# Patient Record
Sex: FEMALE | Employment: UNEMPLOYED | URBAN - METROPOLITAN AREA
[De-identification: names, ages, dates, MRNs, and addresses within clinical notes are randomized per-mention and may not be internally consistent; named-entity substitution may affect disease eponyms.]

---

## 2018-04-10 NOTE — TELEPHONE ENCOUNTER
Patient is not a patient at the [facility]. Please send to Dr. Ponce's [facility]

## 2018-09-19 ENCOUNTER — HOSPITAL ENCOUNTER (EMERGENCY)
Facility: CLINIC | Age: 58
Discharge: HOME | End: 2018-09-19
Attending: EMERGENCY MEDICINE | Admitting: EMERGENCY MEDICINE
Payer: COMMERCIAL

## 2018-09-19 VITALS
RESPIRATION RATE: 16 BRPM | TEMPERATURE: 98.4 F | DIASTOLIC BLOOD PRESSURE: 99 MMHG | SYSTOLIC BLOOD PRESSURE: 144 MMHG | OXYGEN SATURATION: 98 %

## 2018-09-19 DIAGNOSIS — R10.84 ABDOMINAL PAIN, GENERALIZED: ICD-10-CM

## 2018-09-19 PROCEDURE — 99282 EMERGENCY DEPT VISIT SF MDM: CPT

## 2018-09-19 PROCEDURE — 99283 EMERGENCY DEPT VISIT LOW MDM: CPT | Mod: Z6 | Performed by: EMERGENCY MEDICINE

## 2018-09-19 RX ORDER — CETIRIZINE HYDROCHLORIDE 10 MG/1
10 TABLET ORAL DAILY
COMMUNITY

## 2018-09-19 ASSESSMENT — ENCOUNTER SYMPTOMS
VOMITING: 0
FLANK PAIN: 0
SHORTNESS OF BREATH: 0
NAUSEA: 1
CONSTIPATION: 0
CHILLS: 0
COUGH: 0
BLOOD IN STOOL: 0
ABDOMINAL PAIN: 1
FEVER: 0
DYSURIA: 0
APPETITE CHANGE: 1
FREQUENCY: 0
DIARRHEA: 1
ABDOMINAL DISTENTION: 1

## 2018-09-19 NOTE — ED AVS SNAPSHOT
[facility], [place], Emergency Department    [address] [place] [address]    Phone:  [phone]                                       Zahida Rangel   MRN: [id number]    Department:  [facility], Emergency Department   Date of Visit:  9/19/2018           After Visit Summary Signature Page     I have received my discharge instructions, and my questions have been answered. I have discussed any challenges I see with this plan with the nurse or doctor.    ..........................................................................................................................................  Patient/Patient Representative Signature      ..........................................................................................................................................  Patient Representative Print Name and Relationship to Patient    ..................................................               ................................................  Date                                   Time    ..........................................................................................................................................  Reviewed by Signature/Title    ...................................................              ..............................................  Date                                               Time          22EPIC Rev 08/18

## 2018-09-19 NOTE — ED NOTES
Pt refusing to allow registration to complete insurance information until after she has had the opportunity to read through her chart notes for today to ensure that her name was not slandered by the ED MD. Pt was educated on the process for accessing her medical records to include My Chart and the medical records contact information. Dr Thornton updated.

## 2018-09-19 NOTE — ED NOTES
"On discharge Zahida wanted to see her medical record to make sure it didn't have \"refused labs of CT scan\". I informed her that she would need to talk with medical records and that number was given to her. The following she had me write down and place in medical record. States she didn't refuse lab draw or CT and asked for 2nd md for clear explanation of risk/benifits of CT scan in language I understand (in words) on paper this was crossed out so I can make a clear and (conscience) on paper this was crossed out and informed medical decision given hx I have for benefit current & future health\".  "

## 2018-09-19 NOTE — ED PROVIDER NOTES
[facility]GENCY DEPARTMENT ([facility])  September 19, 2018    History     Chief Complaint   Patient presents with     Abdominal Pain     HPI  Zahida Rangel is a 57 year old female with history notable for peritoneal carcinoma (s/p VINCE and BSO 2016), BRCA2 positive s/p prophylactic bilateral mastectomy, eosinophilic esophagitis, anxiety, depression, and PTSD who presents to the ED with 13 days of diffuse abdominal pain.  Patient states that she has been seen by several facilities regarding her abdominal pain and just wants to know why she has it and why it is not getting any better.  She states she has never had similar abdominal pain before.  She states in the past 3 days, pain has gotten more severe and more frequent.  She describes the pain as mostly achy, waxing and waning, and sometimes cramping or stabbing.  She states that the pain is made worse with movement. She reports she has taken 4-5 days of ranitidine without improvement of her symptoms and also had no good relief from taking Tylenol.  She does state that she is passing gas from below and has had looser bowel movements today, secondary from taking a laxative last night.  She states that she started having a couple episodes of watery diarrhea after taking the laxative.  She also complains of some loss of appetite and nausea.  She otherwise currently denies any vomiting, vaginal discharge, vaginal bleeding, dysuria, fevers, previous history of SBO, previous history of reflux, chest pain, or shortness of breath.     Per review of Care Everywhere, patient has had several clinic and ED visits this past month regarding the same symptoms with extensive normal workup. She had a CT of the abdomen and pelvis on 8/24/18 that was negative for acute pathology.  She was seen in UC on 9/9 and was suspected to have diverticulitis, so they gave her a 7 day course of Cipro and Flagyl which did not improve symptoms. An abdominal and pelvic ultrasound  were performed on 9/14/18 and normal.     PAST MEDICAL HISTORY  History reviewed. No pertinent past medical history.     PAST SURGICAL HISTORY  History reviewed. No pertinent surgical history.     FAMILY HISTORY  No family history on file.     SOCIAL HISTORY  Social History   Substance Use Topics     Smoking status: Not on file     Smokeless tobacco: Not on file     Alcohol use Not on file     MEDICATIONS  No current facility-administered medications for this encounter.      Current Outpatient Prescriptions   Medication     cetirizine (ZYRTEC) 10 MG tablet     Eszopiclone (LUNESTA PO)     LORAZEPAM PO     ALLERGIES  Allergies   Allergen Reactions     Dilaudid [Hydromorphone]      Penicillins        I have reviewed the Medications, Allergies, Past Medical and Surgical History, and Social History in the Epic system.    Review of Systems   Constitutional: Positive for appetite change. Negative for chills, fever and unexpected weight change.   Respiratory: Negative for cough and shortness of breath.    Cardiovascular: Negative for chest pain.   Gastrointestinal: Positive for abdominal distention, abdominal pain, diarrhea and nausea. Negative for blood in stool, constipation and vomiting.   Genitourinary: Negative for dysuria, flank pain, frequency, pelvic pain, urgency, vaginal discharge and vaginal pain.   Musculoskeletal: Negative for back pain.   Allergic/Immunologic: Negative for immunocompromised state.   All other systems reviewed and are negative.      Physical Exam   BP: (!) 126/101  Heart Rate: 107  Temp: 98.4  F (36.9  C)  Resp: 16  SpO2: 99 %      Physical Exam   Constitutional: She is oriented to person, place, and time. She appears well-developed and well-nourished. No distress.   HENT:   Head: Normocephalic and atraumatic.   Eyes: Conjunctivae are normal. No scleral icterus.   Neck: Normal range of motion. Neck supple.   Cardiovascular: Normal rate, regular rhythm and normal heart sounds.     Pulmonary/Chest: Effort normal and breath sounds normal. No stridor. No respiratory distress. She has no wheezes. She has no rales.   Abdominal: Soft. She exhibits no distension and no mass. Bowel sounds are increased. There is tenderness in the periumbilical area and left upper quadrant. There is guarding. There is no rebound. No hernia.   Musculoskeletal: Normal range of motion.   Neurological: She is alert and oriented to person, place, and time.   Skin: Skin is warm and dry. No rash noted. She is not diaphoretic. No erythema.   Psychiatric: Her affect is angry and inappropriate. Her speech is not rapid and/or pressured and not slurred. She is agitated. She is not hyperactive, not slowed, not withdrawn and not actively hallucinating. Cognition and memory are normal. She is attentive.   Nursing note and vitals reviewed.      ED Course     ED Course     Procedures   2:37 PM  The patient was seen and examined by Dr. Thornton in Room 19.                Critical Care time:  none             Labs Ordered and Resulted from Time of ED Arrival Up to the Time of Departure from the ED - No data to display                            Assessments & Plan (with Medical Decision Making)   Zahida Rangel is a 57 year old female with history notable for peritoneal carcinoma (s/p VINCE and BSO 2016), BRCA2 positive s/p prophylactic bilateral mastectomy, eosinophilic esophagitis, anxiety, depression, and PTSD who presents to the ED with 13 days of diffuse abdominal pain.     Per review of chart, patient has been seen for abdominal pain 11 times by 9 different providers since August 15th. She was seen at [facility] yesterday with negative work up. I spoke with NP from GI clinic (by phone conversation) who saw patient in clinic this morning. She reports that she had reviewed patient's previous labs and imaging and added on stool studies. She discharged her with the recommendation to follow up with her primary. She did not feel that  "patient's symptoms were related to her previous diagnosis of eosinophilic esophagitis but had considered calling her back to have her obtain an abdominal xray to evaluate for obstruction and to discuss starting a trial PPI. I reviewed CareEverywhere documentation from [facility]. The patient was noted to be verbally abusive to staff at [facility] yesterday and was asked to leave the lobby of the cancer center earlier today when she began yelling in front of other patients. Documentation indicates she was offered multiple options for further care but refused.     Upon arrival to this ED, patient declined labs because she \"didn't see the point.\" When I began reviewing her previous work up the patient expressed disapproval of her previous providers and did not feel she had been cared for adequately. She also stated, \"I'm sorry to be rude, but why does it matter what anyone else did?\" I explained that I was trying to confirm the studies that had already been performed in order to determine if further work up would be of any utility and because most of her care had taken place in a different hospital system, our records may not be entirely up to date. She approved of this reasoning and cooperated with subsequent review of her course of symptoms and testing/treatments. She reported that she had been taking ranitidine prescribed for an allergic reaction but had not noticed an improvement in her abdominal pain while using this medication. She also denied heartburn or difficulty swallowing. She confirmed passage of liquid stool which she insisted was due to taking laxatives a few days ago. She also noted that her abdominal pain had gotten worse over the past 3 days. I suggested testing for C. Diff since her diarrhea started after taking prescribed abx (cipro/flagyl) and her pain had also gotten worse since then. She thought this was unnecessary as her diarrhea was from taking laxatives.     On exam, I noted significant " "tenderness with guarding in the left upper quadrant.     My recommendation was to either repeat a CT of her abdomen or to continue monitoring her symptoms and return if she got worse. She was very displeased with the idea of repeating a CT scan due to her fear of getting cancer again. I acknowledged that this was a reasonable concern and that, I too worried about exposing her to radiation for a study that may not provide any answers. However, because she had developed worsening pain since her last abdominal imaging and because she did have significant tenderness and guarding on exam it is possible that she may have developed surgical problem since her last CT scan. I explained that an Xray would not be able to identify a partial small bowel obstruction or ischemic changes of the bowel. I explained the meaning of \"guarding\" on exam as an indication of possible inflammation or infection within the abdomen that could indicate a more serious surgical issue. I also explained that her gaurding may be in part to anxiety and distress about her symptoms, which she agreed may be the case. At that point, I presented her with the option to obtain a repeat CT scan or follow up with her primary care doctor and continue to monitor her symptoms at home. I reassured her that either option would be reasonable but the decision about the risk versus benefit of exposure to radiation was largely dependent on her preferences and how concerned she was about the recent change in severity of her pain. I reminded her that her labs and vitals suggested she did not have a dangerous condition so I did not feel a CT was absolutely necessary. At this point the patient became frustrated and said, \"so you aren't going to do anything for me?!\" Again, I reminded the patient of her choices. I reviewed the risks and benefits of each option. She ultimately did not want to make a decision without a second opinion. I asked her if she wanted to be " discharged to get a second opinion from another doctor. She was offended that I would discharge her and said she wanted to talk to another doctor in the Emergency Department. I informed her that we do not do that and she demanded to speak with a charge nurse. Please see RN documentation. It was reiterated that patients are not assigned multiple emergency medicine doctors to give second opinions about management. The patient later stated that she did not understand her options. She was offered a repeat discussion with the presence of the charge RN to help explain anything she did not understand. She declined and became increasingly agitated deciding to leave the department. She also wished to dictate the documentation of her medical record and refused to give registration her information until she could review her chart and approve the content of the note from her encounter. She did not want the note to reflect that she had refused labs or CT. She dictated a statement which she demanded the charge RN write down verbatim in her chart. (Please see RN chart entry). She was offered information on self-pay so that she would not have to comply with registration. She was also provided with information on contacting the medical records department for subsequent review of her medical record.        I have reviewed the nursing notes.    I have reviewed the findings, diagnosis, plan and need for follow up with the patient.    Discharge Medication List as of 9/19/2018  3:27 PM          Final diagnoses:   Abdominal pain, generalized     IPerfecto, am serving as a trained medical scribe to document services personally performed by Kayli Thornton MD, based on the provider's statements to me.      Kayli CENTENO MD, was physically present and have reviewed and verified the accuracy of this note documented by Perfecto Torres.    9/19/2018   [facility], [place], EMERGENCY DEPARTMENT     Kayli Thornton MD  09/20/18 [id number]

## 2018-09-19 NOTE — ED TRIAGE NOTES
Pt presents to ED for a complaint of severe abdominal pain for the past 13 days. PT was seen at [facility] yesterday and told to follow up with GI outpatient but the pain was too unbearable.

## 2018-09-19 NOTE — ED AVS SNAPSHOT
[facility] [place], Emergency Department    [address] [address]    Phone:  [phone]                                       Zahida Rangel   MRN: [id number]    Department:  [facility], Emergency Department   Date of Visit:  9/19/2018           Patient Information     Date Of Birth          1960        Your diagnoses for this visit were:     Abdominal pain, generalized        You were seen by Kayli Thornton MD.        Discharge Instructions       Please make an appointment to follow up with Your Primary Care Provider in 1-2 days.      24 Hour Appointment Hotline       To make an appointment at any [facility] clinic, call [phone] ([phone]). If you don't have a family doctor or clinic, we will help you find one. [facility] clinics are conveniently located to serve the needs of you and your family.             Review of your medicines      Our records show that you are taking the medicines listed below. If these are incorrect, please call your family doctor or clinic.        Dose / Directions Last dose taken    cetirizine 10 MG tablet   Commonly known as:  zyrTEC   Dose:  10 mg        Take 10 mg by mouth daily   Refills:  0        LORAZEPAM PO        Take by mouth 2 times daily   Refills:  0        LUNESTA PO        Refills:  0                Orders Needing Specimen Collection     None      Pending Results     No orders found from 9/17/2018 to 9/20/2018.            Pending Culture Results     No orders found from 9/17/2018 to 9/20/2018.            Pending Results Instructions     If you had any lab results that were not finalized at the time of your Discharge, you can call the ED Lab Result RN at [phone]. You will be contacted by this team for any positive Lab results or changes in treatment. The nurses are available 7 days a week from 10A to 6:30P.  You can leave a message 24 hours per day and they will return your call.        Thank you for choosing [facility]       Thank you  "for choosing [facility] for your care. Our goal is always to provide you with excellent care. Hearing back from our patients is one way we can continue to improve our services. Please take a few minutes to complete the written survey that you may receive in the mail after you visit with us. Thank you!        [employer] Information     trivago lets you send messages to your doctor, view your test results, renew your prescriptions, schedule appointments and more. To sign up, go to [email or web address] . Click on \"Log in\" on the left side of the screen, which will take you to the Welcome page. Then click on \"Sign up Now\" on the right side of the page.     You will be asked to enter the access code listed below, as well as some personal information. Please follow the directions to create your username and password.     Your access code is: [email or web address]  Expires: 2018  3:18 PM     Your access code will  in 90 days. If you need help or a new code, please call your [facility] clinic or [phone].        Care EveryWhere ID     This is your Care EveryWhere ID. This could be used by other organizations to access your [facility] medical records  [id number]        Equal Access to Services     RONAL SCHNEIDER AH: Krunal Hitchcock, wajacksonda elizabeth, qaybta kaalmada adelionel, lizzy rodriguez. So [facility] [phone].    ATENCIÓN: Si habla español, tiene a mon disposición servicios gratuitos de asistencia lingüística. Llame al [phone].    We comply with applicable federal civil rights laws and [place] laws. We do not discriminate on the basis of race, color, national origin, age, disability, sex, sexual orientation, or gender identity.            After Visit Summary       This is your record. Keep this with you and show to your community pharmacist(s) and doctor(s) at your next visit.                  "

## 2018-09-20 ASSESSMENT — ENCOUNTER SYMPTOMS
BACK PAIN: 0
UNEXPECTED WEIGHT CHANGE: 0

## 2018-10-25 NOTE — ED NOTES
"Pt requested to see charge nurse. Zahida wanted to see another physician that \"I can trust and have a connection\". Stated she did not refused blood wok or CT. \"The doctor didn't explain it so I could understand. 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 Gave her the option to have md come and talk with her with myself in the room, but she declined. After discussion she said she wanted to leave and at that time got up and started to get dressed. I went to relay information to   " Anesthesia Type: 2% lidocaine with epinephrine

## 2018-11-28 ENCOUNTER — TRANSFERRED RECORDS (OUTPATIENT)
Dept: HEALTH INFORMATION MANAGEMENT | Facility: CLINIC | Age: 58
End: 2018-11-28

## 2019-01-11 ENCOUNTER — PREP FOR PROCEDURE (OUTPATIENT)
Dept: ONCOLOGY | Facility: CLINIC | Age: 59
End: 2019-01-11

## 2023-03-31 ENCOUNTER — PATIENT OUTREACH (OUTPATIENT)
Dept: ONCOLOGY | Facility: CLINIC | Age: 63
End: 2023-03-31
Payer: COMMERCIAL

## 2023-03-31 ENCOUNTER — TRANSCRIBE ORDERS (OUTPATIENT)
Dept: ONCOLOGY | Facility: CLINIC | Age: 63
End: 2023-03-31
Payer: COMMERCIAL

## 2023-03-31 DIAGNOSIS — C78.6 CARCINOMATOSIS PERITONEI (H): Primary | ICD-10-CM

## 2023-03-31 NOTE — PROGRESS NOTES
Referral received for transfer of care to remain with Dr Ponce at [facility]. Per referral instructions, will need follow up appointment in 3 months with labs prior, ok to get labs done in [place] and fax to [facility].

## 2023-04-03 DIAGNOSIS — C78.6 CARCINOMATOSIS PERITONEI (H): Primary | ICD-10-CM

## 2023-04-03 DIAGNOSIS — C56.3 MALIGNANT NEOPLASM OF BILATERAL OVARIES (H): ICD-10-CM

## 2023-04-03 NOTE — PROGRESS NOTES
Dr. Ponce entered orders, labs to be done in [place].     Left VM for pt requesting call back to discuss. Will need to figure out where to send lab orders and also arrange follow-up with Dr. Ambrosio here as requested. Will follow-up pending returned call from pt.     Candy Swain, BSN, RN, PHN, OCN  Hematology/Oncology Nurse Navigator  [facility]  [phone]

## 2023-05-02 ENCOUNTER — VIRTUAL VISIT (OUTPATIENT)
Dept: FAMILY MEDICINE | Facility: CLINIC | Age: 63
End: 2023-05-02
Payer: COMMERCIAL

## 2023-05-02 DIAGNOSIS — Z91.199 NO-SHOW FOR APPOINTMENT: Primary | ICD-10-CM

## 2023-05-02 NOTE — PROGRESS NOTES
This patient was a no show for this scheduled appointment.    Did not answer rooming phone calls and did not connect to video visit.      Kia West, CNP

## 2023-05-12 ENCOUNTER — PATIENT OUTREACH (OUTPATIENT)
Dept: ONCOLOGY | Facility: CLINIC | Age: 63
End: 2023-05-12
Payer: COMMERCIAL

## 2023-05-12 NOTE — TELEPHONE ENCOUNTER
Patient calling to schedule an appointment with Dr Ponce    Patient had questions about [facility] as patient is transferring care from [facility]     RN answered all of these to the best of her ability     Scheduling message sent     Patient appreciative of RN time     Mine Kelly RN

## 2023-05-15 NOTE — PROGRESS NOTES
Patient contacted DEIDRE MaiCC with Dr. Ponce directly regarding arranging follow-up with Dr. Ponce.     Writer/NPS have been trying to reach patient multiple times without success. Per Dr. Ponce, she needs  CBC with diff and CMP every month,  in three months which have all been ordered. Per Dr. Ponce, labs were to be drawn in [place] and faxed to her for review. However, have been unable to reach patient.     Attempted to reach patient again to discuss. Left VM requesting call back to confirm if she's been having labs done in [place] and also to help arrange labs/follow-up with Dr. Ponce ~end of June 2023. Provided direct call-back number and main number for NPS.

## 2023-05-25 NOTE — PROGRESS NOTES
Contacted patient again. Left another VM requesting call back to arrange labs/follow-up with Dr. Ponce. Provided contact info once again.     Hold Kris 6/16 needs labs prior. Will arrange appointment/labs pending call back from pt.

## 2023-06-12 NOTE — PROGRESS NOTES
"Patient called back and spoke with NPS who informed her they were unable to view scheduling instructions (scheduling instructions were in referral and sent to NPS via IB message). Pt then called and left  for writer venting frustrations with transfer of care process stating, \"This level of incompetence, if it weren't so irritating, would be comical.\"     Writer returned call and explained we have tried to reach pt multiple times with no call back to NPS. Explained she did not call back NPS/intake team as requested but instead called and spoke with Pau, RNCC with Dr. Ponce who is unable to schedule \"New\" pt appointments. Explained she and Pau discussed date for appt, but did not confirm time so there was no way to schedule pt without appointment time. Call transferred directly to NPS to confirm they are able to see scheduling instructions and schedule pt as requested.    Writer updated Annalee Lopez on pt frustrations, verbal abusive from pt and voicemail/situation. Will follow-up as needed  "

## 2023-06-12 NOTE — PROGRESS NOTES
Patient called back 6/9 regarding follow-up with Dr. Ponce and left VM stating she just recieved all my previous messages for some reason.     Attempted to reach patient again 6/12. Left another message re: scheduling follow-up 6/13 (spot re-held) or next opening per pt preference. Left call back number for NPS requesting call back to complete registration and schedule appointment.

## 2023-06-13 ENCOUNTER — LAB (OUTPATIENT)
Dept: LAB | Facility: CLINIC | Age: 63
End: 2023-06-13
Payer: COMMERCIAL

## 2023-06-13 DIAGNOSIS — C78.6 CARCINOMATOSIS PERITONEI (H): ICD-10-CM

## 2023-06-13 DIAGNOSIS — C56.3 MALIGNANT NEOPLASM OF BILATERAL OVARIES (H): ICD-10-CM

## 2023-06-13 LAB
ALBUMIN SERPL BCG-MCNC: 4.3 G/DL (ref 3.5–5.2)
ALP SERPL-CCNC: 85 U/L (ref 35–104)
ALT SERPL W P-5'-P-CCNC: 48 U/L (ref 0–50)
ANION GAP SERPL CALCULATED.3IONS-SCNC: 11 MMOL/L (ref 7–15)
AST SERPL W P-5'-P-CCNC: 33 U/L (ref 0–45)
BASOPHILS # BLD AUTO: 0 10E3/UL (ref 0–0.2)
BASOPHILS NFR BLD AUTO: 1 %
BILIRUB SERPL-MCNC: 0.4 MG/DL
BUN SERPL-MCNC: 14.5 MG/DL (ref 8–23)
CALCIUM SERPL-MCNC: 9.6 MG/DL (ref 8.8–10.2)
CANCER AG125 SERPL-ACNC: 8 U/ML
CHLORIDE SERPL-SCNC: 97 MMOL/L (ref 98–107)
CREAT SERPL-MCNC: 1.36 MG/DL (ref 0.51–0.95)
DEPRECATED HCO3 PLAS-SCNC: 26 MMOL/L (ref 22–29)
EOSINOPHIL # BLD AUTO: 0.3 10E3/UL (ref 0–0.7)
EOSINOPHIL NFR BLD AUTO: 3 %
ERYTHROCYTE [DISTWIDTH] IN BLOOD BY AUTOMATED COUNT: 13 % (ref 10–15)
GFR SERPL CREATININE-BSD FRML MDRD: 44 ML/MIN/1.73M2
GLUCOSE SERPL-MCNC: 96 MG/DL (ref 70–99)
HCT VFR BLD AUTO: 42.3 % (ref 35–47)
HGB BLD-MCNC: 13.8 G/DL (ref 11.7–15.7)
IMM GRANULOCYTES # BLD: 0 10E3/UL
IMM GRANULOCYTES NFR BLD: 0 %
LYMPHOCYTES # BLD AUTO: 3 10E3/UL (ref 0.8–5.3)
LYMPHOCYTES NFR BLD AUTO: 38 %
MCH RBC QN AUTO: 31.6 PG (ref 26.5–33)
MCHC RBC AUTO-ENTMCNC: 32.6 G/DL (ref 31.5–36.5)
MCV RBC AUTO: 97 FL (ref 78–100)
MONOCYTES # BLD AUTO: 0.6 10E3/UL (ref 0–1.3)
MONOCYTES NFR BLD AUTO: 8 %
NEUTROPHILS # BLD AUTO: 4 10E3/UL (ref 1.6–8.3)
NEUTROPHILS NFR BLD AUTO: 51 %
PLATELET # BLD AUTO: 205 10E3/UL (ref 150–450)
POTASSIUM SERPL-SCNC: 3.9 MMOL/L (ref 3.4–5.3)
PROT SERPL-MCNC: 6.8 G/DL (ref 6.4–8.3)
RBC # BLD AUTO: 4.37 10E6/UL (ref 3.8–5.2)
SODIUM SERPL-SCNC: 134 MMOL/L (ref 136–145)
WBC # BLD AUTO: 7.8 10E3/UL (ref 4–11)

## 2023-06-13 PROCEDURE — 36415 COLL VENOUS BLD VENIPUNCTURE: CPT

## 2023-06-13 PROCEDURE — 80053 COMPREHEN METABOLIC PANEL: CPT

## 2023-06-13 PROCEDURE — 86304 IMMUNOASSAY TUMOR CA 125: CPT

## 2023-06-13 PROCEDURE — 85025 COMPLETE CBC W/AUTO DIFF WBC: CPT

## 2023-06-15 NOTE — TELEPHONE ENCOUNTER
RECORDS STATUS - ALL OTHER DIAGNOSIS      RECORDS RECEIVED FROM: [facility], [facility], [facility], [facility]   DATE RECEIVED:    NOTES STATUS DETAILS   OFFICE NOTE from medical oncologist ALEXA -  Dr. Ponce: 3/17/23   DISCHARGE SUMMARY from hospital CE -  10/2/18   DISCHARGE REPORT from the ER [facility]/CE -  Many   OPERATIVE REPORT CE - , [facility]  9/25/20, 1/30/20: Breast Implant Removal  7/20/17: IP Port Removal  12/12/16: Exploratory Laparotomy    [facility]  5/30/18: Mastectomy   MEDICATION LIST [facility]   LABS     PATHOLOGY REPORTS [facility], Reports in CE 11/30/16: [id number]  11/28/16: [id number]  7/20/17: [id number]     ANYTHING RELATED TO DIAGNOSIS Epic 6/13/23   GENONOMIC TESTING     TYPE: HP - Received 6/15 11/2018   IMAGING (NEED IMAGES & REPORT)     CT SCANS PACS   11/28/18, 10/2/18, 6/26/17, 6/15/17, 12/2/16, 11/30/16, 11/29/16, 8/14/14, 8/12/14, 5/13/14    [facility]  8/24/18, 4/12/18    [facility]  4/10/18   MRI PACS   6/15/16, 3/1/16   ULTRASOUND PACS   5/9/19, 9/14/18, 4/11/18, 1/2/18, 9/28/17, 11/30/16 2/15/16, 2/12/15, 3/28/13   PET PACS   1/28/19, 11/21/18, 3/29/18, 5/30/17

## 2023-06-20 ENCOUNTER — PRE VISIT (OUTPATIENT)
Dept: ONCOLOGY | Facility: CLINIC | Age: 63
End: 2023-06-20
Payer: COMMERCIAL

## 2023-06-20 ENCOUNTER — ONCOLOGY VISIT (OUTPATIENT)
Dept: ONCOLOGY | Facility: CLINIC | Age: 63
End: 2023-06-20
Attending: OBSTETRICS & GYNECOLOGY
Payer: COMMERCIAL

## 2023-06-20 DIAGNOSIS — C78.6 CARCINOMATOSIS PERITONEI (H): Primary | ICD-10-CM

## 2023-06-20 PROCEDURE — G0463 HOSPITAL OUTPT CLINIC VISIT: HCPCS | Performed by: OBSTETRICS & GYNECOLOGY

## 2023-06-20 PROCEDURE — 99214 OFFICE O/P EST MOD 30 MIN: CPT | Performed by: OBSTETRICS & GYNECOLOGY

## 2023-06-20 RX ORDER — CLONAZEPAM 0.5 MG/1
0.25 TABLET ORAL
COMMUNITY
Start: 2023-04-28

## 2023-06-20 RX ORDER — TRIAMCINOLONE ACETONIDE 5 MG/G
CREAM TOPICAL 2 TIMES DAILY
COMMUNITY
Start: 2022-07-21

## 2023-06-20 RX ORDER — LANOLIN ALCOHOL/MO/W.PET/CERES
CREAM (GRAM) TOPICAL
COMMUNITY

## 2023-06-20 RX ORDER — MULTIPLE VITAMINS W/ MINERALS TAB 9MG-400MCG
TAB ORAL
COMMUNITY

## 2023-06-20 RX ORDER — ALBUTEROL SULFATE 90 UG/1
2 AEROSOL, METERED RESPIRATORY (INHALATION) EVERY 4 HOURS PRN
COMMUNITY
Start: 2022-07-20

## 2023-06-20 RX ORDER — PREDNISONE 20 MG/1
2 TABLET ORAL
COMMUNITY
Start: 2022-07-05

## 2023-06-20 RX ORDER — CALCIUM CARB/VITAMIN D3/VIT K1 500-500-40
2 TABLET,CHEWABLE ORAL DAILY
COMMUNITY

## 2023-06-20 RX ORDER — VIT C/B6/B5/MAGNESIUM/HERB 173 50-5-6-5MG
1 CAPSULE ORAL DAILY
COMMUNITY

## 2023-06-20 RX ORDER — ZOLPIDEM TARTRATE 5 MG/1
5 TABLET ORAL
COMMUNITY
Start: 2022-12-27

## 2023-06-20 RX ORDER — COLLAGEN, HYDROLYSATE (BOVINE) 100 %
POWDER (GRAM) MISCELLANEOUS
COMMUNITY

## 2023-06-20 NOTE — LETTER
6/20/2023         RE: Zahida Rangel  [address]  [address]  [place] [address]        Dear Colleague,    Thank you for referring your patient, Zahida Rangel, to the [facility]. Please see a copy of my visit note below.                            Consult Notes on Referred Patient    Date: 6/20/2023     Patient is seen today for follow-up, transfer of care from [facility].       Her history is as follows:    2-26-16: Referred to Gyn Onc for evaluation of cervical fibroid with prominent vascularity. CA-125 39.     3-11-16: Pap smear normal. Repeat  33. MRI pelvis showed 3.6 x 3.2 x 3cm mass right side of cervix, most likley a leiomyoma. EMS normal thickness. Small cystic area in cul de sac measuring 1.5cm, possible peritoneal inclusion cyst. Recommended repeat imaging in three months    6-15-16:  22. MRI pelvis with 2.7 x 3.0 x 3.3cm mass right posterolateral aspect of cervix, consistent with fibroid and similar to prior MRI. EMS 4mm. No further Gyn Onc follow-up recommended.    11/29 to 11/30/16: Admitted to [facility] with abdominal pain. CT abd/pelvis with peritoneal carcinomatosis. Fullness left ovary. Pelvic US with normal uterus, ES 7.4mm. Hypoechoic mass cervix measuring 3.2 x 2.8cm, ovaries normal, no free fluid.  53. Underwent CT guided biopsy of omental area showing neoplasm of Muellerian origin, difficult to characterize due to small amount of tissue.    12/12/16: Exploratory laparotomy, modified radical hysterectomy, bilateral salpingo-oophorectomy, bilateral pelvic and para-aortic lymphadenectomy, omentectomy, pelvic washings, intraperitoneal port placement, proctoscopy. Operative findings were notable for very small volume disease limited to pelvis, no obvious lesions in omentum or upper abdomen. R0 resected to no visible disease. 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 +Cytology. 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    The patient returns for follow-up. She was seen by [place] by Dr. Bella for second opinion regarding chemotherapy. Dose dense as well as IV/IP were discussed.     1/12/17: Cycle #1 IV/IP Cisplatin and Taxol. Reaction to IV taxol, able to tolerate after slow infusion. Steroids premed for IP taxol, tolerated.  40    2/2/17: Cycle #2 IV/IP Cisplatin and Taxol.  30    2/23/17: Cycle #3 IV/IP Cisplatin and Taxol.  20    3/16/17: Cycle #4 IV/IP Cisplatin and Taxol.  18    4/6/17: Cycle #5 IV/IP Cisplatin and Taxol.  17    4/28/17: Cycle #6 IV/IP Cisplatin and Taxol.  17. Day 8 held due to anemia. Transfused 1 unit. Day 8 given on 5/12/17.    5/30/17: PET scan with no evidence of disease. 15 x 7 cm lymphocele on left, 8 x 4cm on right.    Underwent bilateral drain placements to treat lymphoceles. Complicated by infection with abscess development. Sclerotherapy performed and drains removed on 7/10/17. Had IP port removed on 7/20/17.    9/15/17: Complained of RLQ pain. Pelvic US negative.  9    11/27/17:  10    12/21/17:  9    3/28/18:  8 PET/CT on 3/29/18 BRIGIDO    4/10/18: CT A/P for RLQ pain BRIGIDO. US Appendix with no free fluid    4/12/18: CT A/P soft tissue stranding mesenteric fat right anterior abdominal fat    8/24/18: CT A/P for abdominal pain negative.  16    9/14/18: US abd for pain, negative for hernia. Seen in clinic for LLQ pain with associated pain with bladder emptying, feels that something is tethering her bladder to the abdominal wall and holding it there. Has seen Urology with negative workup. Was lying flat and noted a bulge in lower abdomen with associated tenderness. Had US today that was negative for hernia. Declined CT scan and cannot have MRI due to implants.     9/28/18:  82    10/2/18: CT A/P for ongoing  pain. Multipled dilated loops for bowel with possible ileitis.    10/18/18:  62    11/19/18:  104    11/21/18: PET with recurrent peritoneal carcinomatosis. 1.2cm nodule mid abdomen, 1.2cm nodule sigmoid colon    11/28/18: CT biopsy Path showed recurrent high grade carcinoma. Discussed salvage chemotherapy +/- PARPi with plans for possible surgery    12/5/18: C1D1 Carbo/Doxil. First cycle difficult, appeal ongoing for PARPi.  168    12/14/18: Seen in clinic. Plan for surgery after second rather than first cycle, coordinate with breast surgery.    1/4/19: C2D1 Carbo/Doxil. Hold on PARP due to counts, not wanting to potentially delay surgery.  90    1/28/19: PET CT with complete response, no signs of disease. Decision not to proceed with surgery due to CR.    2/1/19: C3D1 Carbo/Doxil. Continue to hold on PARP.  30     3/1/19: C4D1 Carbo/Doxil. Neulasta.  13    3/29/19: C5D1 Carbo/Doxil. Neulasta  10    4/23/19:  10    4/26/19: C6D1 Carbo/Doxil. Neulasta. Patient wanted to proceed with breast surgery ASAP.    6/10/19: Breast reconstruction done on 6/10/19.     6/25/19: PARPi Olaparib started 300mg BID    7/11/19: Dose reduced after two weeks due to significant fatigue. 200mg BID.     7/19/19: Some frustration with how she is feeling on the PARPi. Perhaps slightly better at lower dose but still dosen't like it. Continued at 200mg BID.    8/30/19: BRIGIDO, remains on 200mg BID of PARPi    10/11/19: Stable, remains on 200mg BID of PARPi,  7    12/20/19: Routine visit. Significant fatigue. PARP increased to 150mg BID at patient request for one month.    1/24/20: Developed breast cellulitis and implant infection while in [place]     1/30/20: Infected implant removed.  7    Increased PARPi to 200mg BID at end of March.    3/26/20: Labs in [place].  7    4/17/20: Phone visit. Back from [place]. Some occassional pelvic/pubic bone cramping. Due to ongoing fatigue, decreased PARPi  to 150mg AM and 200mg in PM.  9    7/24/20: Due to ongoing fatigue, has dropped to 150mg BID, remains compliant    8/6/20:  8, Labs WNL. Crt 1.36    8/21/20: Seen in clinic. BRIGIDO on exam. Continue PARPi at 150mg BID    9/11/20: Patient seen today for unscheduled visit. Had episode of bleeding x 1 on 9/8/20. Exam showed periurethral laceration. Continue PARP. Labs in [place].  9    3/9/21 -  7 Remains on PARP 150mg BID    7/7/21:  6    9/17/21:  9    12/13/21:  12 Remains on PARP 150mg BID    3/7/22:  8    5/11/22:  10 Remains on PARP 150mg BID    6/23/22:  11 Remains on PARP 150mg BID    Stopped PARP August 25 due to some issues with fluid retention.    Was off PARP until October 2022.    10/25/22:   11.  PARP resumed at 100mg BID    12/22:  Stable.  PARP 100mg BID    3/17/23:  Stable.  PARP 100mg BID    6/13/23:  Labs reviewed.   8, Cr 1.36    Patient seen today for evaluation.  Doing well, remains on PARP.  Visiting [place] for a period of time.  Otherwise, continues to live in [place].  No new symptoms.           Past Medical History:    No past medical history on file.      Past Surgical History:    No past surgical history on file.      Health Maintenance:  Health Maintenance Due   Topic Date Due    ADVANCE CARE PLANNING  Never done    COVID-19 Vaccine (1) Never done    COLORECTAL CANCER SCREENING  Never done    HIV SCREENING  Never done    HEPATITIS C SCREENING  Never done    ZOSTER IMMUNIZATION (1 of 2) Never done    PAP  Never done    LIPID  Never done    MAMMO SCREENING  06/24/2018    PHQ-2 (once per calendar year)  Never done         Current Medications:     has a current medication list which includes the following prescription(s): albuterol, clonazepam, olaparib, triamcinolone, zolpidem, alpha-lipoic acid, calcium carbonate, cetirizine, cholecalciferol, collagen hydrolysate, eszopiclone, george (zingiber officinalis), lorazepam, magnesium  bisglycinate, melatonin, multivitamin w/minerals, prednisone, and turmeric.       Allergies:     Dilaudid [hydromorphone] and Penicillins        Social History:     Social History     Tobacco Use    Smoking status: Never    Smokeless tobacco: Never   Substance Use Topics    Alcohol use: Not on file       History   Drug Use Not on file           Family History:     The patient's family history is notable for:    No family history on file.      Physical Exam:     LMP  (LMP Unknown)   There is no height or weight on file to calculate BMI.    General Appearance: healthy and alert, no distress     Assessment:    63yo with history of recurrent primary peritoneal cancer, now on PARP.    Total time for the day of visit was 30 minutes including, but not limited to, non-face-to-face time spent reviewing records, counseling, and coordination of care.    Plan:       1.) Recurrent platinum sensitive primary peritoneal cancer: Completed two cycles with PET showing complete response. Had previously discussed possible secondary surgery given small volume disease with tentative plan had been for surgery in early January 2019, after first cycle, and then changed to surgery after her second cycle to allow for better planning with her breast surgeon to get expanders removed and implants placed.     Given complete response based on PET, decision was made not to proceed with surgery as I felt there was minimal to no benefit in undergoing a potentially complex surgical exploration with no obvious signs of disease on imaging but with potential significant delays in chemo if she has a complication.    Therefore, recommended completing six cycles of chemo and then PARPi maintenance. She completed six cycles. She then completed her breast surgery and started PARPi, olaparib, in June 2019. Dose reduction (300BID to 200BID) after two weeks due to fatigue, very small decrease in Hgb and Cr slightly elevated. Dose decreased to 150mg BID in  12/2019 due to fatigue and negative impact on QOL. Stayed at 150mg BID for about three months and then back at 200mg BID and tolerating well. In April 2020, struggling again with fatigue so dropped to PARPi to 150mg in AM, 200mg in PM to ensure continuation and then to 150mg BID which she maintained from July 2020 to August 2022.     Was on a short break from PARP from August 2022 to October 2022 at which time she resumed at 100mg BID.  Discussed risks of long term PARP, SOLO study with 22% of patients on PARP for >5 years. Up to 8% risk of AML. Patient has benefited from PARP. Risks and benefits of continued use discussed.     Informed decision made to continue PARP.  Zahida remains stable, no new symptoms.  stable.  Tolerating PARP at 100mg BID.  Her Cr is slightly elevated over her baseline on recent labs and I would like her to get this rechecked in a couple of weeks.  Otherwise, continue monthly labs.  Repeat  in three months.     Return in three months with labs and .     Chata Ponce MD  Gynecologic Oncology  [facility]    CC  Patient Care Team:  Homa Cruz as PCP - General (Internal Medicine)  Chata Ponce MD as MD (Gynecologic Oncology)

## 2023-07-05 ENCOUNTER — PATIENT OUTREACH (OUTPATIENT)
Dept: ONCOLOGY | Facility: CLINIC | Age: 63
End: 2023-07-05
Payer: COMMERCIAL

## 2023-07-05 ENCOUNTER — LAB (OUTPATIENT)
Dept: LAB | Facility: CLINIC | Age: 63
End: 2023-07-05
Payer: COMMERCIAL

## 2023-07-05 DIAGNOSIS — C56.3 MALIGNANT NEOPLASM OF BILATERAL OVARIES (H): Primary | ICD-10-CM

## 2023-07-05 DIAGNOSIS — C78.6 CARCINOMATOSIS PERITONEI (H): ICD-10-CM

## 2023-07-05 DIAGNOSIS — C56.3 MALIGNANT NEOPLASM OF BILATERAL OVARIES (H): ICD-10-CM

## 2023-07-05 LAB
BASOPHILS # BLD AUTO: 0 10E3/UL (ref 0–0.2)
BASOPHILS NFR BLD AUTO: 0 %
EOSINOPHIL # BLD AUTO: 0.2 10E3/UL (ref 0–0.7)
EOSINOPHIL NFR BLD AUTO: 3 %
ERYTHROCYTE [DISTWIDTH] IN BLOOD BY AUTOMATED COUNT: 13.3 % (ref 10–15)
HCT VFR BLD AUTO: 41.6 % (ref 35–47)
HGB BLD-MCNC: 13.8 G/DL (ref 11.7–15.7)
IMM GRANULOCYTES # BLD: 0 10E3/UL
IMM GRANULOCYTES NFR BLD: 0 %
LYMPHOCYTES # BLD AUTO: 2.8 10E3/UL (ref 0.8–5.3)
LYMPHOCYTES NFR BLD AUTO: 32 %
MCH RBC QN AUTO: 31.7 PG (ref 26.5–33)
MCHC RBC AUTO-ENTMCNC: 33.2 G/DL (ref 31.5–36.5)
MCV RBC AUTO: 95 FL (ref 78–100)
MONOCYTES # BLD AUTO: 0.8 10E3/UL (ref 0–1.3)
MONOCYTES NFR BLD AUTO: 9 %
NEUTROPHILS # BLD AUTO: 4.9 10E3/UL (ref 1.6–8.3)
NEUTROPHILS NFR BLD AUTO: 56 %
PLATELET # BLD AUTO: 204 10E3/UL (ref 150–450)
RBC # BLD AUTO: 4.36 10E6/UL (ref 3.8–5.2)
WBC # BLD AUTO: 8.8 10E3/UL (ref 4–11)

## 2023-07-05 PROCEDURE — 85025 COMPLETE CBC W/AUTO DIFF WBC: CPT

## 2023-07-05 PROCEDURE — 80053 COMPREHEN METABOLIC PANEL: CPT

## 2023-07-05 PROCEDURE — 36415 COLL VENOUS BLD VENIPUNCTURE: CPT

## 2023-07-05 NOTE — PROGRESS NOTES
Scheduling checked in with patient in regards to need for lab appt this week    Per Scheduling: I spoke with pt and she is going to do walk in lab at her convenience this week and I confirmed with her that orders are in      Mine Kelly RN     4

## 2023-07-06 LAB
ALBUMIN SERPL BCG-MCNC: 4.4 G/DL (ref 3.5–5.2)
ALP SERPL-CCNC: 76 U/L (ref 35–104)
ALT SERPL W P-5'-P-CCNC: 41 U/L (ref 0–50)
ANION GAP SERPL CALCULATED.3IONS-SCNC: 11 MMOL/L (ref 7–15)
AST SERPL W P-5'-P-CCNC: 30 U/L (ref 0–45)
BILIRUB SERPL-MCNC: 0.5 MG/DL
BUN SERPL-MCNC: 18.5 MG/DL (ref 8–23)
CALCIUM SERPL-MCNC: 10.1 MG/DL (ref 8.8–10.2)
CHLORIDE SERPL-SCNC: 98 MMOL/L (ref 98–107)
CREAT SERPL-MCNC: 1.2 MG/DL (ref 0.51–0.95)
DEPRECATED HCO3 PLAS-SCNC: 28 MMOL/L (ref 22–29)
GFR SERPL CREATININE-BSD FRML MDRD: 51 ML/MIN/1.73M2
GLUCOSE SERPL-MCNC: 97 MG/DL (ref 70–99)
POTASSIUM SERPL-SCNC: 4 MMOL/L (ref 3.4–5.3)
PROT SERPL-MCNC: 6.8 G/DL (ref 6.4–8.3)
SODIUM SERPL-SCNC: 137 MMOL/L (ref 136–145)

## 2023-07-07 ENCOUNTER — PATIENT OUTREACH (OUTPATIENT)
Dept: ONCOLOGY | Facility: CLINIC | Age: 63
End: 2023-07-07
Payer: COMMERCIAL

## 2023-07-07 DIAGNOSIS — C78.6 CARCINOMATOSIS PERITONEI (H): Primary | ICD-10-CM

## 2023-07-07 NOTE — PROGRESS NOTES
Patient updated on lab results and follow up plan per MD request     RN answered all questions to the best of her ability     Patient appreciative of the call     Mine Kelly RN

## 2023-07-17 NOTE — PROGRESS NOTES
Consult Notes on Referred Patient    Date: 6/20/2023     Patient is seen today for follow-up, transfer of care from [facility].       Her history is as follows:    2-26-16: Referred to Gyn Onc for evaluation of cervical fibroid with prominent vascularity. CA-125 39.     3-11-16: Pap smear normal. Repeat  33. MRI pelvis showed 3.6 x 3.2 x 3cm mass right side of cervix, most likley a leiomyoma. EMS normal thickness. Small cystic area in cul de sac measuring 1.5cm, possible peritoneal inclusion cyst. Recommended repeat imaging in three months    6-15-16:  22. MRI pelvis with 2.7 x 3.0 x 3.3cm mass right posterolateral aspect of cervix, consistent with fibroid and similar to prior MRI. EMS 4mm. No further Gyn Onc follow-up recommended.    11/29 to 11/30/16: Admitted to [facility] with abdominal pain. CT abd/pelvis with peritoneal carcinomatosis. Fullness left ovary. Pelvic US with normal uterus, ES 7.4mm. Hypoechoic mass cervix measuring 3.2 x 2.8cm, ovaries normal, no free fluid.  53. Underwent CT guided biopsy of omental area showing neoplasm of Muellerian origin, difficult to characterize due to small amount of tissue.    12/12/16: Exploratory laparotomy, modified radical hysterectomy, bilateral salpingo-oophorectomy, bilateral pelvic and para-aortic lymphadenectomy, omentectomy, pelvic washings, intraperitoneal port placement, proctoscopy. Operative findings were notable for very small volume disease limited to pelvis, no obvious lesions in omentum or upper abdomen. R0 resected to no visible disease. 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 +Cytology. 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    The patient returns for follow-up. She was seen by [place] by Dr. Bella for second opinion regarding chemotherapy. Dose dense as  well as IV/IP were discussed.     1/12/17: Cycle #1 IV/IP Cisplatin and Taxol. Reaction to IV taxol, able to tolerate after slow infusion. Steroids premed for IP taxol, tolerated.  40    2/2/17: Cycle #2 IV/IP Cisplatin and Taxol.  30    2/23/17: Cycle #3 IV/IP Cisplatin and Taxol.  20    3/16/17: Cycle #4 IV/IP Cisplatin and Taxol.  18    4/6/17: Cycle #5 IV/IP Cisplatin and Taxol.  17    4/28/17: Cycle #6 IV/IP Cisplatin and Taxol.  17. Day 8 held due to anemia. Transfused 1 unit. Day 8 given on 5/12/17.    5/30/17: PET scan with no evidence of disease. 15 x 7 cm lymphocele on left, 8 x 4cm on right.    Underwent bilateral drain placements to treat lymphoceles. Complicated by infection with abscess development. Sclerotherapy performed and drains removed on 7/10/17. Had IP port removed on 7/20/17.    9/15/17: Complained of RLQ pain. Pelvic US negative.  9    11/27/17:  10    12/21/17:  9    3/28/18:  8 PET/CT on 3/29/18 BRIGIDO    4/10/18: CT A/P for RLQ pain BRIGIDO. US Appendix with no free fluid    4/12/18: CT A/P soft tissue stranding mesenteric fat right anterior abdominal fat    8/24/18: CT A/P for abdominal pain negative.  16    9/14/18: US abd for pain, negative for hernia. Seen in clinic for LLQ pain with associated pain with bladder emptying, feels that something is tethering her bladder to the abdominal wall and holding it there. Has seen Urology with negative workup. Was lying flat and noted a bulge in lower abdomen with associated tenderness. Had US today that was negative for hernia. Declined CT scan and cannot have MRI due to implants.     9/28/18:  82    10/2/18: CT A/P for ongoing pain. Multipled dilated loops for bowel with possible ileitis.    10/18/18:  62    11/19/18:  104    11/21/18: PET with recurrent peritoneal carcinomatosis. 1.2cm nodule mid abdomen, 1.2cm nodule sigmoid colon    11/28/18: CT biopsy Path showed  recurrent high grade carcinoma. Discussed salvage chemotherapy +/- PARPi with plans for possible surgery    12/5/18: C1D1 Carbo/Doxil. First cycle difficult, appeal ongoing for PARPi.  168    12/14/18: Seen in clinic. Plan for surgery after second rather than first cycle, coordinate with breast surgery.    1/4/19: C2D1 Carbo/Doxil. Hold on PARP due to counts, not wanting to potentially delay surgery.  90    1/28/19: PET CT with complete response, no signs of disease. Decision not to proceed with surgery due to CR.    2/1/19: C3D1 Carbo/Doxil. Continue to hold on PARP.  30     3/1/19: C4D1 Carbo/Doxil. Neulasta.  13    3/29/19: C5D1 Carbo/Doxil. Neulasta  10    4/23/19:  10    4/26/19: C6D1 Carbo/Doxil. Neulasta. Patient wanted to proceed with breast surgery ASAP.    6/10/19: Breast reconstruction done on 6/10/19.     6/25/19: PARPi Olaparib started 300mg BID    7/11/19: Dose reduced after two weeks due to significant fatigue. 200mg BID.     7/19/19: Some frustration with how she is feeling on the PARPi. Perhaps slightly better at lower dose but still dosen't like it. Continued at 200mg BID.    8/30/19: BRIGIDO, remains on 200mg BID of PARPi    10/11/19: Stable, remains on 200mg BID of PARPi,  7    12/20/19: Routine visit. Significant fatigue. PARP increased to 150mg BID at patient request for one month.    1/24/20: Developed breast cellulitis and implant infection while in [place]     1/30/20: Infected implant removed.  7    Increased PARPi to 200mg BID at end of March.    3/26/20: Labs in [place].  7    4/17/20: Phone visit. Back from [place]. Some occassional pelvic/pubic bone cramping. Due to ongoing fatigue, decreased PARPi to 150mg AM and 200mg in PM.  9    7/24/20: Due to ongoing fatigue, has dropped to 150mg BID, remains compliant    8/6/20:  8, Labs WNL. Crt 1.36    8/21/20: Seen in clinic. BRIGIDO on exam. Continue PARPi at 150mg BID    9/11/20: Patient seen today  for unscheduled visit. Had episode of bleeding x 1 on 9/8/20. Exam showed periurethral laceration. Continue PARP. Labs in [place].  9    3/9/21 -  7 Remains on PARP 150mg BID    7/7/21:  6    9/17/21:  9    12/13/21:  12 Remains on PARP 150mg BID    3/7/22:  8    5/11/22:  10 Remains on PARP 150mg BID    6/23/22:  11 Remains on PARP 150mg BID    Stopped PARP August 25 due to some issues with fluid retention.    Was off PARP until October 2022.    10/25/22:   11.  PARP resumed at 100mg BID    12/22:  Stable.  PARP 100mg BID    3/17/23:  Stable.  PARP 100mg BID    6/13/23:  Labs reviewed.   8, Cr 1.36    Patient seen today for evaluation.  Doing well, remains on PARP.  Visiting [place] for a period of time.  Otherwise, continues to live in [place].  No new symptoms.           Past Medical History:    No past medical history on file.      Past Surgical History:    No past surgical history on file.      Health Maintenance:  Health Maintenance Due   Topic Date Due     ADVANCE CARE PLANNING  Never done     COVID-19 Vaccine (1) Never done     COLORECTAL CANCER SCREENING  Never done     HIV SCREENING  Never done     HEPATITIS C SCREENING  Never done     ZOSTER IMMUNIZATION (1 of 2) Never done     PAP  Never done     LIPID  Never done     MAMMO SCREENING  06/24/2018     PHQ-2 (once per calendar year)  Never done         Current Medications:     has a current medication list which includes the following prescription(s): albuterol, clonazepam, olaparib, triamcinolone, zolpidem, alpha-lipoic acid, calcium carbonate, cetirizine, cholecalciferol, collagen hydrolysate, eszopiclone, george (zingiber officinalis), lorazepam, magnesium bisglycinate, melatonin, multivitamin w/minerals, prednisone, and turmeric.       Allergies:     Dilaudid [hydromorphone] and Penicillins        Social History:     Social History     Tobacco Use     Smoking status: Never     Smokeless tobacco: Never    Substance Use Topics     Alcohol use: Not on file       History   Drug Use Not on file           Family History:     The patient's family history is notable for:    No family history on file.      Physical Exam:     LMP  (LMP Unknown)   There is no height or weight on file to calculate BMI.    General Appearance: healthy and alert, no distress     Assessment:    63yo with history of recurrent primary peritoneal cancer, now on PARP.    Total time for the day of visit was 30 minutes including, but not limited to, non-face-to-face time spent reviewing records, counseling, and coordination of care.    Plan:       1.) Recurrent platinum sensitive primary peritoneal cancer: Completed two cycles with PET showing complete response. Had previously discussed possible secondary surgery given small volume disease with tentative plan had been for surgery in early January 2019, after first cycle, and then changed to surgery after her second cycle to allow for better planning with her breast surgeon to get expanders removed and implants placed.     Given complete response based on PET, decision was made not to proceed with surgery as I felt there was minimal to no benefit in undergoing a potentially complex surgical exploration with no obvious signs of disease on imaging but with potential significant delays in chemo if she has a complication.    Therefore, recommended completing six cycles of chemo and then PARPi maintenance. She completed six cycles. She then completed her breast surgery and started PARPi, olaparib, in June 2019. Dose reduction (300BID to 200BID) after two weeks due to fatigue, very small decrease in Hgb and Cr slightly elevated. Dose decreased to 150mg BID in 12/2019 due to fatigue and negative impact on QOL. Stayed at 150mg BID for about three months and then back at 200mg BID and tolerating well. In April 2020, struggling again with fatigue so dropped to PARPi to 150mg in AM, 200mg in PM to ensure  continuation and then to 150mg BID which she maintained from July 2020 to August 2022.     Was on a short break from PARP from August 2022 to October 2022 at which time she resumed at 100mg BID.  Discussed risks of long term PARP, SOLO study with 22% of patients on PARP for >5 years. Up to 8% risk of AML. Patient has benefited from PARP. Risks and benefits of continued use discussed.     Informed decision made to continue PARP.  Zahida remains stable, no new symptoms.  stable.  Tolerating PARP at 100mg BID.  Her Cr is slightly elevated over her baseline on recent labs and I would like her to get this rechecked in a couple of weeks.  Otherwise, continue monthly labs.  Repeat  in three months.     Return in three months with labs and .     Chata Ponce MD  Gynecologic Oncology  [facility]            CC  Patient Care Team:  Homa Cruz as PCP - General (Internal Medicine)  Chata Ponce MD as MD (Gynecologic Oncology)  SELF, REFERRED

## 2023-08-08 ENCOUNTER — PATIENT OUTREACH (OUTPATIENT)
Dept: ONCOLOGY | Facility: CLINIC | Age: 63
End: 2023-08-08

## 2023-08-08 ENCOUNTER — LAB (OUTPATIENT)
Dept: LAB | Facility: CLINIC | Age: 63
End: 2023-08-08
Payer: COMMERCIAL

## 2023-08-08 ENCOUNTER — TELEPHONE (OUTPATIENT)
Dept: ONCOLOGY | Facility: CLINIC | Age: 63
End: 2023-08-08

## 2023-08-08 DIAGNOSIS — C78.6 CARCINOMATOSIS PERITONEI (H): Primary | ICD-10-CM

## 2023-08-08 LAB
ALBUMIN SERPL BCG-MCNC: 4.4 G/DL (ref 3.5–5.2)
ALP SERPL-CCNC: 74 U/L (ref 35–104)
ALT SERPL W P-5'-P-CCNC: 51 U/L (ref 0–50)
ANION GAP SERPL CALCULATED.3IONS-SCNC: 11 MMOL/L (ref 7–15)
AST SERPL W P-5'-P-CCNC: 33 U/L (ref 0–45)
BASOPHILS # BLD AUTO: 0 10E3/UL (ref 0–0.2)
BASOPHILS NFR BLD AUTO: 0 %
BILIRUB SERPL-MCNC: 0.7 MG/DL
BUN SERPL-MCNC: 17 MG/DL (ref 8–23)
CALCIUM SERPL-MCNC: 9.5 MG/DL (ref 8.8–10.2)
CHLORIDE SERPL-SCNC: 97 MMOL/L (ref 98–107)
CREAT SERPL-MCNC: 1.19 MG/DL (ref 0.51–0.95)
DEPRECATED HCO3 PLAS-SCNC: 25 MMOL/L (ref 22–29)
EOSINOPHIL # BLD AUTO: 0.2 10E3/UL (ref 0–0.7)
EOSINOPHIL NFR BLD AUTO: 2 %
ERYTHROCYTE [DISTWIDTH] IN BLOOD BY AUTOMATED COUNT: 13.2 % (ref 10–15)
GFR SERPL CREATININE-BSD FRML MDRD: 52 ML/MIN/1.73M2
GLUCOSE SERPL-MCNC: 96 MG/DL (ref 70–99)
HCT VFR BLD AUTO: 43 % (ref 35–47)
HGB BLD-MCNC: 14.7 G/DL (ref 11.7–15.7)
HOLD SPECIMEN: NORMAL
IMM GRANULOCYTES # BLD: 0 10E3/UL
IMM GRANULOCYTES NFR BLD: 0 %
LYMPHOCYTES # BLD AUTO: 3 10E3/UL (ref 0.8–5.3)
LYMPHOCYTES NFR BLD AUTO: 28 %
MCH RBC QN AUTO: 32.4 PG (ref 26.5–33)
MCHC RBC AUTO-ENTMCNC: 34.2 G/DL (ref 31.5–36.5)
MCV RBC AUTO: 95 FL (ref 78–100)
MONOCYTES # BLD AUTO: 0.9 10E3/UL (ref 0–1.3)
MONOCYTES NFR BLD AUTO: 9 %
NEUTROPHILS # BLD AUTO: 6.5 10E3/UL (ref 1.6–8.3)
NEUTROPHILS NFR BLD AUTO: 61 %
PLATELET # BLD AUTO: 213 10E3/UL (ref 150–450)
POTASSIUM SERPL-SCNC: 4.2 MMOL/L (ref 3.4–5.3)
PROT SERPL-MCNC: 7 G/DL (ref 6.4–8.3)
RBC # BLD AUTO: 4.54 10E6/UL (ref 3.8–5.2)
SODIUM SERPL-SCNC: 133 MMOL/L (ref 136–145)
WBC # BLD AUTO: 10.6 10E3/UL (ref 4–11)

## 2023-08-08 PROCEDURE — 80053 COMPREHEN METABOLIC PANEL: CPT

## 2023-08-08 PROCEDURE — 85025 COMPLETE CBC W/AUTO DIFF WBC: CPT

## 2023-08-08 PROCEDURE — 36415 COLL VENOUS BLD VENIPUNCTURE: CPT

## 2023-08-08 NOTE — PROGRESS NOTES
Patient reached out with some concerns     Patient pulled a muscle in her back and is having pain and now is having bladder issues     Patient states that this has made her nervous about her  and potential cancer return     RN reviewed symptoms and concerns     RN and patient reviewed potentially seeing NP for symptoms and they can evaluate if a  or anything else was needed     Patient kindly declined a visit at this time as she has started Physical therapy and would like to see if her symptoms improve    Patient will reach out if she decided she would like to be seen or if symptoms change/increase or new ones arise     RN answered all questions to the best of her ability     Patient very appreciative of RN time     Mine Kelly RN

## 2023-08-15 ENCOUNTER — PATIENT OUTREACH (OUTPATIENT)
Dept: ONCOLOGY | Facility: CLINIC | Age: 63
End: 2023-08-15
Payer: COMMERCIAL

## 2023-08-15 PROBLEM — Z15.01 BRCA2 GENE MUTATION POSITIVE: Status: ACTIVE | Noted: 2023-08-15

## 2023-08-15 PROBLEM — Z15.09 BRCA2 GENE MUTATION POSITIVE: Status: ACTIVE | Noted: 2023-08-15

## 2023-08-15 PROBLEM — C48.2: Status: ACTIVE | Noted: 2023-08-15

## 2023-08-15 NOTE — PROGRESS NOTES
Gynecologic Oncology Return Visit Note    Date: Aug 16, 2023     RE: Zahida Rangel  : 1960  RAYMOND: Aug 16, 2023     CC: Recurrent primary peritoneal carcinoma    HPI:  Zahida Rangel is a 62 year old woman with a history of primary peritoneal carcinoma.  She is here today for an acute visit.     Oncology History:  16: Referred to Gyn Onc for evaluation of cervical fibroid with prominent vascularity. CA-125 39.     3-: Pap smear normal. Repeat  33. MRI pelvis showed 3.6 x 3.2 x 3cm mass right side of cervix, most likley a leiomyoma. EMS normal thickness. Small cystic area in cul de sac measuring 1.5cm, possible peritoneal inclusion cyst. Recommended repeat imaging in three months    6-15-16:  22. MRI pelvis with 2.7 x 3.0 x 3.3cm mass right posterolateral aspect of cervix, consistent with fibroid and similar to prior MRI. EMS 4mm. No further Gyn Onc follow-up recommended.     to 16: Admitted to [facility] with abdominal pain. CT abd/pelvis with peritoneal carcinomatosis. Fullness left ovary. Pelvic US with normal uterus, ES 7.4mm. Hypoechoic mass cervix measuring 3.2 x 2.8cm, ovaries normal, no free fluid.  53. Underwent CT guided biopsy of omental area showing neoplasm of Muellerian origin, difficult to characterize due to small amount of tissue.    16: Exploratory laparotomy, modified radical hysterectomy, bilateral salpingo-oophorectomy, bilateral pelvic and para-aortic lymphadenectomy, omentectomy, pelvic washings, intraperitoneal port placement, proctoscopy. Operative findings were notable for very small volume disease limited to pelvis, no obvious lesions in omentum or upper abdomen. R0 resected to no visible disease. 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 +Cytology. Final FIGO Stage IIIA primary  peritoneal ca.    The patient returns for follow-up. She was seen by [place] by Dr. Bella for second opinion regarding chemotherapy. Dose dense as well as IV/IP were discussed.     1/12/17: Cycle #1 IV/IP Cisplatin and Taxol. Reaction to IV taxol, able to tolerate after slow infusion. Steroids premed for IP taxol, tolerated.  40    2/2/17: Cycle #2 IV/IP Cisplatin and Taxol.  30    2/23/17: Cycle #3 IV/IP Cisplatin and Taxol.  20    3/16/17: Cycle #4 IV/IP Cisplatin and Taxol.  18    4/6/17: Cycle #5 IV/IP Cisplatin and Taxol.  17    4/28/17: Cycle #6 IV/IP Cisplatin and Taxol.  17. Day 8 held due to anemia. Transfused 1 unit. Day 8 given on 5/12/17.    5/30/17: PET scan with no evidence of disease. 15 x 7 cm lymphocele on left, 8 x 4cm on right.    Underwent bilateral drain placements to treat lymphoceles. Complicated by infection with abscess development. Sclerotherapy performed and drains removed on 7/10/17. Had IP port removed on 7/20/17.    9/15/17: Complained of RLQ pain. Pelvic US negative.  9    11/27/17:  10    12/21/17:  9    3/28/18:  8 PET/CT on 3/29/18 BRIGIDO    4/10/18: CT A/P for RLQ pain BRIGIDO. US Appendix with no free fluid    4/12/18: CT A/P soft tissue stranding mesenteric fat right anterior abdominal fat    8/24/18: CT A/P for abdominal pain negative.  16    9/14/18: US abd for pain, negative for hernia. Seen in clinic for LLQ pain with associated pain with bladder emptying, feels that something is tethering her bladder to the abdominal wall and holding it there. Has seen Urology with negative workup. Was lying flat and noted a bulge in lower abdomen with associated tenderness. Had US today that was negative for hernia. Declined CT scan and cannot have MRI due to implants.     9/28/18:  82    10/2/18: CT A/P for ongoing pain. Multipled dilated loops for bowel with possible ileitis.    10/18/18:  62    11/19/18:   104    11/21/18: PET with recurrent peritoneal carcinomatosis. 1.2cm nodule mid abdomen, 1.2cm nodule sigmoid colon    11/28/18: CT biopsy Path showed recurrent high grade carcinoma. Discussed salvage chemotherapy +/- PARPi with plans for possible surgery    12/5/18: C1D1 Carbo/Doxil. First cycle difficult, appeal ongoing for PARPi.  168    12/14/18: Seen in clinic. Plan for surgery after second rather than first cycle, coordinate with breast surgery.    1/4/19: C2D1 Carbo/Doxil. Hold on PARP due to counts, not wanting to potentially delay surgery.  90    1/28/19: PET CT with complete response, no signs of disease. Decision not to proceed with surgery due to CR.    2/1/19: C3D1 Carbo/Doxil. Continue to hold on PARP.  30     3/1/19: C4D1 Carbo/Doxil. Neulasta.  13    3/29/19: C5D1 Carbo/Doxil. Neulasta  10    4/23/19:  10    4/26/19: C6D1 Carbo/Doxil. Neulasta. Patient wanted to proceed with breast surgery ASAP.    6/10/19: Breast reconstruction done on 6/10/19.     6/25/19: PARPi Olaparib started 300mg BID    7/11/19: Dose reduced after two weeks due to significant fatigue. 200mg BID.     7/19/19: Some frustration with how she is feeling on the PARPi. Perhaps slightly better at lower dose but still dosen't like it. Continued at 200mg BID.    8/30/19: BRIGIDO, remains on 200mg BID of PARPi    10/11/19: Stable, remains on 200mg BID of PARPi,  7    12/20/19: Routine visit. Significant fatigue. PARP increased to 150mg BID at patient request for one month.    1/24/20: Developed breast cellulitis and implant infection while in [place]     1/30/20: Infected implant removed.  7    Increased PARPi to 200mg BID at end of March.    3/26/20: Labs in [place].  7    4/17/20: Phone visit. Back from [place]. Some occassional pelvic/pubic bone cramping. Due to ongoing fatigue, decreased PARPi to 150mg AM and 200mg in PM.  9    7/24/20: Due to ongoing fatigue, has dropped to 150mg BID, remains  "compliant    8/6/20:  8, Labs WNL. Crt 1.36    8/21/20: Seen in clinic. BRIGIDO on exam. Continue PARPi at 150mg BID    9/11/20: Patient seen today for unscheduled visit. Had episode of bleeding x 1 on 9/8/20. Exam showed periurethral laceration. Continue PARP. Labs in [place].  9    3/9/21 -  7 Remains on PARP 150mg BID  7/7/21:  6  9/17/21:  9  12/13/21:  12 Remains on PARP 150mg BID  3/7/22:  8  5/11/22:  10 Remains on PARP 150mg BID  6/23/22:  11 Remains on PARP 150mg BID    Stopped PARP August 25 due to some issues with fluid retention.    Was off PARP until October 2022.     10/25/22:   11.  PARP resumed at 100mg BID  12/22:  Stable.  PARP 100mg BID  3/17/23:  Stable.  PARP 100mg BID  6/13/23:  Labs reviewed.   8, Cr 1.36                Today comes to clinic with multiple concerns and reports \"chaos with her family\" and a lot of stress.  After discussion sounds like she has been arguing with her daughter who she has been staying with and is now staying in a hotel until she can go home to [place].  She is having nausea intermittently no vomiting.  She had a recent back injury with back pain.  She has some right axilla pain and thinks she feels a lump.  She is concerned because olaparib may cause \"lymphoma\".  She did have a prophylactic bilateral mastectomy in the past due to her BRCA2 mutation.  She has some left groin \"psoas muscle\" pain that is not new.  She is taking olaparib as prescribed without difficulty.  She denies any vaginal bleeding, no changes in her bowel or bladder habits, no abdominal discomfort/bloating.  She had some labs done a couple weeks ago.                Review of Systems:    Systemic           no weight changes; no fever; no chills; no night sweats; no appetite changes  Skin           no rashes, or lesions  Eye           no irritation; no changes in vision  Harini-Laryngeal           no dysphagia; no hoarseness   Pulmonary    no " cough; no shortness of breath  Cardiovascular    no chest pain; no palpitations  Gastrointestinal    no diarrhea; no constipation; no abdominal pain; no changes in bowel habits; no blood in stool  Genitourinary   no urinary frequency; no urinary urgency; no dysuria; no pain; no abnormal vaginal discharge; no abnormal vaginal bleeding  Breast   no breast discharge; no breast changes; no breast pain  Musculoskeletal    no myalgias; no arthralgias; no back pain  Psychiatric           no depressed mood; no anxiety    Hematologic   no tender lymph nodes; no noticeable swellings or lumps   Endocrine    no hot flashes; no heat/cold intolerance         Neurological   no tremor; no numbness and tingling; no headaches; no difficulty sleeping      Past Medical History:    No past medical history on file.      Past Surgical History:    No past surgical history on file.      Health Maintenance Due   Topic Date Due    ADVANCE CARE PLANNING  Never done    COVID-19 Vaccine (1) Never done    COLORECTAL CANCER SCREENING  Never done    HIV SCREENING  Never done    HEPATITIS C SCREENING  Never done    ZOSTER IMMUNIZATION (1 of 2) Never done    PAP  Never done    LIPID  Never done    MAMMO SCREENING  06/24/2018    PHQ-2 (once per calendar year)  Never done       Current Medications:     Current Outpatient Medications   Medication Sig Dispense Refill    albuterol (PROAIR HFA/PROVENTIL HFA/VENTOLIN HFA) 108 (90 Base) MCG/ACT inhaler Inhale 2 puffs into the lungs every 4 hours as needed      Alpha-Lipoic Acid 200 MG TABS Take 2 capsules by mouth daily      calcium carbonate (OS-ALFONSO) 1500 (600 Ca) MG tablet Take 1,200 mg by mouth      cetirizine (ZYRTEC) 10 MG tablet Take 10 mg by mouth daily      cholecalciferol (VITAMIN D3) 25 mcg (1000 units) capsule Take 1 capsule by mouth daily      clonazePAM (KLONOPIN) 0.5 MG tablet Take 0.25 mg by mouth      Collagen Hydrolysate POWD       Eszopiclone (LUNESTA PO)       Ginger, Zingiber officinalis,  250 MG CAPS Take 250 mg by mouth      LORAZEPAM PO Take by mouth 2 times daily      MAGNESIUM BISGLYCINATE PO Take 2 teaspoons by mouth once daily      melatonin 3 MG tablet       multivitamin w/minerals (MULTI-VITAMIN) tablet       olaparib (LYNPARZA) 100 MG tablet Take 100 mg by mouth      predniSONE (DELTASONE) 20 MG tablet Take 2 tablets by mouth daily at 2 pm      triamcinolone (ARISTOCORT HP) 0.5 % external cream Apply topically 2 times daily      Turmeric 500 MG CAPS Take 1 capsule by mouth daily      zolpidem (AMBIEN) 5 MG tablet Take 5 mg by mouth           Allergies:        Allergies   Allergen Reactions    Dilaudid [Hydromorphone]     Penicillins         Social History:     Social History     Tobacco Use    Smoking status: Never    Smokeless tobacco: Never   Substance Use Topics    Alcohol use: Not on file       History   Drug Use Not on file         Family History:     The patient's family history is notable for:    No family history on file.      Physical Exam:     /87   Pulse 79   Temp 98  F (36.7  C) (Oral)   LMP  (LMP Unknown)   SpO2 99%   There is no height or weight on file to calculate BMI.    General Appearance: healthy and alert, no distress     HEENT: no palpable nodules or masses        Cardiovascular: regular rate and rhythm, no gallops, rubs or murmurs     Respiratory: lungs clear, no rales, rhonchi or wheezes    Chest: Exam was done in the upright and supine position. Mastectomy scars present. No dominant fixed or suspicious masses noted on palpation. No skin or nipple changes. No cervical, clavicular, or axillary lymphadenopathy.     Musculoskeletal: extremities non tender and without edema    Skin: no lesions or rashes     Neurological: normal gait, no gross defects     Psychiatric: appropriate mood and affect                               Hematological: normal cervical and supraclavicular lymph nodes     Gastrointestinal:       abdomen soft, non-tender,  non-distended    Genitourinary: Deferred.     Lab Results   Component Value Date    WBC 10.6 08/08/2023     Lab Results   Component Value Date    RBC 4.54 08/08/2023     Lab Results   Component Value Date    HGB 14.7 08/08/2023     Lab Results   Component Value Date    HCT 43.0 08/08/2023     No components found for: MCT  Lab Results   Component Value Date    MCV 95 08/08/2023     Lab Results   Component Value Date    MCH 32.4 08/08/2023     Lab Results   Component Value Date    MCHC 34.2 08/08/2023     Lab Results   Component Value Date    RDW 13.2 08/08/2023     Lab Results   Component Value Date     08/08/2023     % Neutrophils   Date Value Ref Range Status   08/08/2023 61 % Final     % Lymphocytes   Date Value Ref Range Status   08/08/2023 28 % Final     % Monocytes   Date Value Ref Range Status   08/08/2023 9 % Final     % Eosinophils   Date Value Ref Range Status   08/08/2023 2 % Final     % Basophils   Date Value Ref Range Status   08/08/2023 0 % Final     Absolute Immature Granulocytes   Date Value Ref Range Status   08/08/2023 0.0 <=0.4 10e3/uL Final       Last Comprehensive Metabolic Panel:  Sodium   Date Value Ref Range Status   08/08/2023 133 (L) 136 - 145 mmol/L Final     Potassium   Date Value Ref Range Status   08/08/2023 4.2 3.4 - 5.3 mmol/L Final     Chloride   Date Value Ref Range Status   08/08/2023 97 (L) 98 - 107 mmol/L Final     Chloride (External)   Date Value Ref Range Status   04/17/2023 101 98 - 110 mmol/L Final     Carbon Dioxide (CO2)   Date Value Ref Range Status   08/08/2023 25 22 - 29 mmol/L Final     Anion Gap   Date Value Ref Range Status   08/08/2023 11 7 - 15 mmol/L Final     Glucose   Date Value Ref Range Status   08/08/2023 96 70 - 99 mg/dL Final     Urea Nitrogen   Date Value Ref Range Status   08/08/2023 17.0 8.0 - 23.0 mg/dL Final     Creatinine   Date Value Ref Range Status   08/08/2023 1.19 (H) 0.51 - 0.95 mg/dL Final     GFR Estimate   Date Value Ref Range Status    08/08/2023 52 (L) >60 mL/min/1.73m2 Final     Calcium   Date Value Ref Range Status   08/08/2023 9.5 8.8 - 10.2 mg/dL Final     Bilirubin Total   Date Value Ref Range Status   08/08/2023 0.7 <=1.2 mg/dL Final     Alkaline Phosphatase   Date Value Ref Range Status   08/08/2023 74 35 - 104 U/L Final     ALT   Date Value Ref Range Status   08/08/2023 51 (H) 0 - 50 U/L Final     Comment:     Reference intervals for this test were updated on 6/12/2023 to more accurately reflect our healthy population. There may be differences in the flagging of prior results with similar values performed with this method. Interpretation of those prior results can be made in the context of the updated reference intervals.       AST   Date Value Ref Range Status   08/08/2023 33 0 - 45 U/L Final     Comment:     Reference intervals for this test were updated on 6/12/2023 to more accurately reflect our healthy population. There may be differences in the flagging of prior results with similar values performed with this method. Interpretation of those prior results can be made in the context of the updated reference intervals.     Lab Results   Component Value Date    ALBUMIN 4.4 08/08/2023     Lab Results   Component Value Date    PROTTOTAL 7.0 08/08/2023         Assessment:    Zahida Rangel is a 62 year old woman with a history of primary peritoneal carcinoma.  She is here today for an acute visit.     20 minutes spent on the date of the encounter doing chart review, history and exam, documentation, and further activities as noted above.      Plan:     1.) Peritoneal cancer:  BRIGIDO on exam.  Provided reassurance that exam was normal and symptoms are not concerning for a recurrence of her peritoneal cancer or a lymphoma/leukemia from her olaparib.  No chest wall or axillar abnormalities on exam to suggest breast cancer.  Labs from a couple week ago are also very reassuring.   Appears Dr. Ponce wanted to see her next month but do to her  family dynamics she will be traveling home to [place], but does return periodically.  RTC when able to present for an in person visit (Dr. Ponce can not do a virtual visit while she is in [place]).  Reviewed signs and symptoms for when she should contact the clinic or seek additional care.  Patient to contact the clinic with any questions or concerns in the interim.      Genetic risk factors were assessed and she underwent testin 12/27/16 with [facility] and was positive for a pathologic BRCA2 variant, BRCA2 ([id number]).  She was negative for pathologic mutation in APC, ANGEL, AXIN2, BARD1, BMPR1A, BRCA1, BRIP1, CDH1, CDK4, CDKN2A, CHEK2, EPCAM, FANCC, GREM1, MLH1, MSH2, MSH6, MUTYH, NBN, PALB2, PMS2, POLD1, POLE, PTEN, RAD51C, RAD51D, SMAD4, STK11, TP53, VHL, and XRCC2 genes.    Labs and/or tests ordered include:  None.     2.) Health maintenance:  Issues addressed today include following up with PCP for annual health maintenance and non-gynecologic issues.       Sally Jerez, DNP, APRN, FNP-C  Nurse Practitioner  Division of Gynecologic Oncology  Pager: [phone]     CC  Patient Care Team:  Homa Cruz as PCP - General (Internal Medicine)  Chata Ponce MD as MD (Gynecologic Oncology)  Chata Ponce MD as Assigned Cancer Care Provider  SELF, REFERRED

## 2023-08-15 NOTE — PROGRESS NOTES
Patient reached out concerned about symptoms and would like to be seen    Patient states she is just not feeling great. A little nauseas.     Patient also states that she feels a lump and has pain in her arm pit and she read the medication is on can cause lymphoma so she is concerned    Patient is under a lot of stress and thinks that this might be the cause of her symptoms     Patient would like to be seen if she can be seen this week.     Appointment options reviewed     Patient agrees to see NP 8/16     Patient appreciative of RN time and assistance     Urgent scheduling request sent. Appointment placed on hold     Mine Kelly RN

## 2023-08-16 ENCOUNTER — ONCOLOGY VISIT (OUTPATIENT)
Dept: ONCOLOGY | Facility: CLINIC | Age: 63
End: 2023-08-16
Attending: OBSTETRICS & GYNECOLOGY
Payer: COMMERCIAL

## 2023-08-16 VITALS
HEART RATE: 79 BPM | SYSTOLIC BLOOD PRESSURE: 139 MMHG | OXYGEN SATURATION: 99 % | TEMPERATURE: 98 F | DIASTOLIC BLOOD PRESSURE: 87 MMHG

## 2023-08-16 DIAGNOSIS — Z15.09 BRCA2 GENE MUTATION POSITIVE: ICD-10-CM

## 2023-08-16 DIAGNOSIS — C48.2 PRIMARY CANCER OF PERITONEUM (H): Primary | ICD-10-CM

## 2023-08-16 DIAGNOSIS — Z15.01 BRCA2 GENE MUTATION POSITIVE: ICD-10-CM

## 2023-08-16 PROCEDURE — 99213 OFFICE O/P EST LOW 20 MIN: CPT | Performed by: NURSE PRACTITIONER

## 2023-08-16 PROCEDURE — G0463 HOSPITAL OUTPT CLINIC VISIT: HCPCS | Performed by: NURSE PRACTITIONER

## 2023-08-16 ASSESSMENT — PAIN SCALES - GENERAL: PAINLEVEL: MILD PAIN (3)

## 2023-08-16 NOTE — LETTER
2023         RE: Zahida Rangel  [address]  [address]  [facility] [address]        Dear Colleague,    Thank you for referring your patient, Zahida Rangel, to the [facility]. Please see a copy of my visit note below.    Gynecologic Oncology Return Visit Note    Date: Aug 16, 2023     RE: Zahida Rangel  : 1960  RAYMOND: Aug 16, 2023     CC: Recurrent primary peritoneal carcinoma    HPI:  Zahida Rangel is a 62 year old woman with a history of primary peritoneal carcinoma.  She is here today for an acute visit.     Oncology History:  16: Referred to Gyn Onc for evaluation of cervical fibroid with prominent vascularity. CA-125 39.     3-: Pap smear normal. Repeat  33. MRI pelvis showed 3.6 x 3.2 x 3cm mass right side of cervix, most likley a leiomyoma. EMS normal thickness. Small cystic area in cul de sac measuring 1.5cm, possible peritoneal inclusion cyst. Recommended repeat imaging in three months    6-15-16:  22. MRI pelvis with 2.7 x 3.0 x 3.3cm mass right posterolateral aspect of cervix, consistent with fibroid and similar to prior MRI. EMS 4mm. No further Gyn Onc follow-up recommended.     to 16: Admitted to [facility] with abdominal pain. CT abd/pelvis with peritoneal carcinomatosis. Fullness left ovary. Pelvic US with normal uterus, ES 7.4mm. Hypoechoic mass cervix measuring 3.2 x 2.8cm, ovaries normal, no free fluid.  53. Underwent CT guided biopsy of omental area showing neoplasm of Muellerian origin, difficult to characterize due to small amount of tissue.    16: Exploratory laparotomy, modified radical hysterectomy, bilateral salpingo-oophorectomy, bilateral pelvic and para-aortic lymphadenectomy, omentectomy, pelvic washings, intraperitoneal port placement, proctoscopy. Operative findings were notable for very small volume disease limited to pelvis, no obvious lesions in omentum or upper abdomen. R0 resected to no  visible disease. 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 +Cytology. 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    The patient returns for follow-up. She was seen by [place] by Dr. Bella for second opinion regarding chemotherapy. Dose dense as well as IV/IP were discussed.     1/12/17: Cycle #1 IV/IP Cisplatin and Taxol. Reaction to IV taxol, able to tolerate after slow infusion. Steroids premed for IP taxol, tolerated.  40    2/2/17: Cycle #2 IV/IP Cisplatin and Taxol.  30    2/23/17: Cycle #3 IV/IP Cisplatin and Taxol.  20    3/16/17: Cycle #4 IV/IP Cisplatin and Taxol.  18    4/6/17: Cycle #5 IV/IP Cisplatin and Taxol.  17    4/28/17: Cycle #6 IV/IP Cisplatin and Taxol.  17. Day 8 held due to anemia. Transfused 1 unit. Day 8 given on 5/12/17.    5/30/17: PET scan with no evidence of disease. 15 x 7 cm lymphocele on left, 8 x 4cm on right.    Underwent bilateral drain placements to treat lymphoceles. Complicated by infection with abscess development. Sclerotherapy performed and drains removed on 7/10/17. Had IP port removed on 7/20/17.    9/15/17: Complained of RLQ pain. Pelvic US negative.  9    11/27/17:  10    12/21/17:  9    3/28/18:  8 PET/CT on 3/29/18 BRIGIDO    4/10/18: CT A/P for RLQ pain BRIGIDO. US Appendix with no free fluid    4/12/18: CT A/P soft tissue stranding mesenteric fat right anterior abdominal fat    8/24/18: CT A/P for abdominal pain negative.  16    9/14/18: US abd for pain, negative for hernia. Seen in clinic for LLQ pain with associated pain with bladder emptying, feels that something is tethering her bladder to the abdominal wall and holding it there. Has seen Urology with negative workup. Was lying flat and noted a bulge in lower abdomen with associated tenderness. Had US  today that was negative for hernia. Declined CT scan and cannot have MRI due to implants.     9/28/18:  82    10/2/18: CT A/P for ongoing pain. Multipled dilated loops for bowel with possible ileitis.    10/18/18:  62    11/19/18:  104    11/21/18: PET with recurrent peritoneal carcinomatosis. 1.2cm nodule mid abdomen, 1.2cm nodule sigmoid colon    11/28/18: CT biopsy Path showed recurrent high grade carcinoma. Discussed salvage chemotherapy +/- PARPi with plans for possible surgery    12/5/18: C1D1 Carbo/Doxil. First cycle difficult, appeal ongoing for PARPi.  168    12/14/18: Seen in clinic. Plan for surgery after second rather than first cycle, coordinate with breast surgery.    1/4/19: C2D1 Carbo/Doxil. Hold on PARP due to counts, not wanting to potentially delay surgery.  90    1/28/19: PET CT with complete response, no signs of disease. Decision not to proceed with surgery due to CR.    2/1/19: C3D1 Carbo/Doxil. Continue to hold on PARP.  30     3/1/19: C4D1 Carbo/Doxil. Neulasta.  13    3/29/19: C5D1 Carbo/Doxil. Neulasta  10    4/23/19:  10    4/26/19: C6D1 Carbo/Doxil. Neulasta. Patient wanted to proceed with breast surgery ASA.    6/10/19: Breast reconstruction done on 6/10/19.     6/25/19: PARPi Olaparib started 300mg BID    7/11/19: Dose reduced after two weeks due to significant fatigue. 200mg BID.     7/19/19: Some frustration with how she is feeling on the PARPi. Perhaps slightly better at lower dose but still dosen't like it. Continued at 200mg BID.    8/30/19: BRIGIDO, remains on 200mg BID of PARPi    10/11/19: Stable, remains on 200mg BID of PARPi,  7    12/20/19: Routine visit. Significant fatigue. PARP increased to 150mg BID at patient request for one month.    1/24/20: Developed breast cellulitis and implant infection while in [place]     1/30/20: Infected implant removed.  7    Increased PARPi to 200mg BID at end of March.    3/26/20:  "Labs in [place].  7    4/17/20: Phone visit. Back from [place]. Some occassional pelvic/pubic bone cramping. Due to ongoing fatigue, decreased PARPi to 150mg AM and 200mg in PM.  9    7/24/20: Due to ongoing fatigue, has dropped to 150mg BID, remains compliant    8/6/20:  8, Labs WNL. Crt 1.36    8/21/20: Seen in clinic. BRIGIDO on exam. Continue PARPi at 150mg BID    9/11/20: Patient seen today for unscheduled visit. Had episode of bleeding x 1 on 9/8/20. Exam showed periurethral laceration. Continue PARP. Labs in [place].  9    3/9/21 -  7 Remains on PARP 150mg BID  7/7/21:  6  9/17/21:  9  12/13/21:  12 Remains on PARP 150mg BID  3/7/22:  8  5/11/22:  10 Remains on PARP 150mg BID  6/23/22:  11 Remains on PARP 150mg BID    Stopped PARP August 25 due to some issues with fluid retention.    Was off PARP until October 2022.     10/25/22:   11.  PARP resumed at 100mg BID  12/22:  Stable.  PARP 100mg BID  3/17/23:  Stable.  PARP 100mg BID  6/13/23:  Labs reviewed.   8, Cr 1.36                Today comes to clinic with multiple concerns and reports \"chaos with her family\" and a lot of stress.  After discussion sounds like she has been arguing with her daughter who she has been staying with and is now staying in a hotel until she can go home to [place].  She is having nausea intermittently no vomiting.  She had a recent back injury with back pain.  She has some right axilla pain and thinks she feels a lump.  She is concerned because olaparib may cause \"lymphoma\".  She did have a prophylactic bilateral mastectomy in the past due to her BRCA2 mutation.  She has some left groin \"psoas muscle\" pain that is not new.  She is taking olaparib as prescribed without difficulty.  She denies any vaginal bleeding, no changes in her bowel or bladder habits, no abdominal discomfort/bloating.  She had some labs done a couple weeks ago.                Review of " Systems:    Systemic           no weight changes; no fever; no chills; no night sweats; no appetite changes  Skin           no rashes, or lesions  Eye           no irritation; no changes in vision  Harini-Laryngeal           no dysphagia; no hoarseness   Pulmonary    no cough; no shortness of breath  Cardiovascular    no chest pain; no palpitations  Gastrointestinal    no diarrhea; no constipation; no abdominal pain; no changes in bowel habits; no blood in stool  Genitourinary   no urinary frequency; no urinary urgency; no dysuria; no pain; no abnormal vaginal discharge; no abnormal vaginal bleeding  Breast   no breast discharge; no breast changes; no breast pain  Musculoskeletal    no myalgias; no arthralgias; no back pain  Psychiatric           no depressed mood; no anxiety    Hematologic   no tender lymph nodes; no noticeable swellings or lumps   Endocrine    no hot flashes; no heat/cold intolerance         Neurological   no tremor; no numbness and tingling; no headaches; no difficulty sleeping      Past Medical History:    No past medical history on file.      Past Surgical History:    No past surgical history on file.      Health Maintenance Due   Topic Date Due    ADVANCE CARE PLANNING  Never done    COVID-19 Vaccine (1) Never done    COLORECTAL CANCER SCREENING  Never done    HIV SCREENING  Never done    HEPATITIS C SCREENING  Never done    ZOSTER IMMUNIZATION (1 of 2) Never done    PAP  Never done    LIPID  Never done    MAMMO SCREENING  06/24/2018    PHQ-2 (once per calendar year)  Never done       Current Medications:     Current Outpatient Medications   Medication Sig Dispense Refill    albuterol (PROAIR HFA/PROVENTIL HFA/VENTOLIN HFA) 108 (90 Base) MCG/ACT inhaler Inhale 2 puffs into the lungs every 4 hours as needed      Alpha-Lipoic Acid 200 MG TABS Take 2 capsules by mouth daily      calcium carbonate (OS-ALFONSO) 1500 (600 Ca) MG tablet Take 1,200 mg by mouth      cetirizine (ZYRTEC) 10 MG tablet Take 10 mg  by mouth daily      cholecalciferol (VITAMIN D3) 25 mcg (1000 units) capsule Take 1 capsule by mouth daily      clonazePAM (KLONOPIN) 0.5 MG tablet Take 0.25 mg by mouth      Collagen Hydrolysate POWD       Eszopiclone (LUNESTA PO)       Suzie, Zingiber officinalis, 250 MG CAPS Take 250 mg by mouth      LORAZEPAM PO Take by mouth 2 times daily      MAGNESIUM BISGLYCINATE PO Take 2 teaspoons by mouth once daily      melatonin 3 MG tablet       multivitamin w/minerals (MULTI-VITAMIN) tablet       olaparib (LYNPARZA) 100 MG tablet Take 100 mg by mouth      predniSONE (DELTASONE) 20 MG tablet Take 2 tablets by mouth daily at 2 pm      triamcinolone (ARISTOCORT HP) 0.5 % external cream Apply topically 2 times daily      Turmeric 500 MG CAPS Take 1 capsule by mouth daily      zolpidem (AMBIEN) 5 MG tablet Take 5 mg by mouth           Allergies:        Allergies   Allergen Reactions    Dilaudid [Hydromorphone]     Penicillins         Social History:     Social History     Tobacco Use    Smoking status: Never    Smokeless tobacco: Never   Substance Use Topics    Alcohol use: Not on file       History   Drug Use Not on file         Family History:     The patient's family history is notable for:    No family history on file.      Physical Exam:     /87   Pulse 79   Temp 98  F (36.7  C) (Oral)   LMP  (LMP Unknown)   SpO2 99%   There is no height or weight on file to calculate BMI.    General Appearance: healthy and alert, no distress     HEENT: no palpable nodules or masses        Cardiovascular: regular rate and rhythm, no gallops, rubs or murmurs     Respiratory: lungs clear, no rales, rhonchi or wheezes    Chest: Exam was done in the upright and supine position. Mastectomy scars present. No dominant fixed or suspicious masses noted on palpation. No skin or nipple changes. No cervical, clavicular, or axillary lymphadenopathy.     Musculoskeletal: extremities non tender and without edema    Skin: no lesions or  rashes     Neurological: normal gait, no gross defects     Psychiatric: appropriate mood and affect                               Hematological: normal cervical and supraclavicular lymph nodes     Gastrointestinal:       abdomen soft, non-tender, non-distended    Genitourinary: Deferred.     Lab Results   Component Value Date    WBC 10.6 08/08/2023     Lab Results   Component Value Date    RBC 4.54 08/08/2023     Lab Results   Component Value Date    HGB 14.7 08/08/2023     Lab Results   Component Value Date    HCT 43.0 08/08/2023     No components found for: MCT  Lab Results   Component Value Date    MCV 95 08/08/2023     Lab Results   Component Value Date    MCH 32.4 08/08/2023     Lab Results   Component Value Date    MCHC 34.2 08/08/2023     Lab Results   Component Value Date    RDW 13.2 08/08/2023     Lab Results   Component Value Date     08/08/2023     % Neutrophils   Date Value Ref Range Status   08/08/2023 61 % Final     % Lymphocytes   Date Value Ref Range Status   08/08/2023 28 % Final     % Monocytes   Date Value Ref Range Status   08/08/2023 9 % Final     % Eosinophils   Date Value Ref Range Status   08/08/2023 2 % Final     % Basophils   Date Value Ref Range Status   08/08/2023 0 % Final     Absolute Immature Granulocytes   Date Value Ref Range Status   08/08/2023 0.0 <=0.4 10e3/uL Final       Last Comprehensive Metabolic Panel:  Sodium   Date Value Ref Range Status   08/08/2023 133 (L) 136 - 145 mmol/L Final     Potassium   Date Value Ref Range Status   08/08/2023 4.2 3.4 - 5.3 mmol/L Final     Chloride   Date Value Ref Range Status   08/08/2023 97 (L) 98 - 107 mmol/L Final     Chloride (External)   Date Value Ref Range Status   04/17/2023 101 98 - 110 mmol/L Final     Carbon Dioxide (CO2)   Date Value Ref Range Status   08/08/2023 25 22 - 29 mmol/L Final     Anion Gap   Date Value Ref Range Status   08/08/2023 11 7 - 15 mmol/L Final     Glucose   Date Value Ref Range Status   08/08/2023 96 70  - 99 mg/dL Final     Urea Nitrogen   Date Value Ref Range Status   08/08/2023 17.0 8.0 - 23.0 mg/dL Final     Creatinine   Date Value Ref Range Status   08/08/2023 1.19 (H) 0.51 - 0.95 mg/dL Final     GFR Estimate   Date Value Ref Range Status   08/08/2023 52 (L) >60 mL/min/1.73m2 Final     Calcium   Date Value Ref Range Status   08/08/2023 9.5 8.8 - 10.2 mg/dL Final     Bilirubin Total   Date Value Ref Range Status   08/08/2023 0.7 <=1.2 mg/dL Final     Alkaline Phosphatase   Date Value Ref Range Status   08/08/2023 74 35 - 104 U/L Final     ALT   Date Value Ref Range Status   08/08/2023 51 (H) 0 - 50 U/L Final     Comment:     Reference intervals for this test were updated on 6/12/2023 to more accurately reflect our healthy population. There may be differences in the flagging of prior results with similar values performed with this method. Interpretation of those prior results can be made in the context of the updated reference intervals.       AST   Date Value Ref Range Status   08/08/2023 33 0 - 45 U/L Final     Comment:     Reference intervals for this test were updated on 6/12/2023 to more accurately reflect our healthy population. There may be differences in the flagging of prior results with similar values performed with this method. Interpretation of those prior results can be made in the context of the updated reference intervals.     Lab Results   Component Value Date    ALBUMIN 4.4 08/08/2023     Lab Results   Component Value Date    PROTTOTAL 7.0 08/08/2023         Assessment:    Zahida Rangel is a 62 year old woman with a history of primary peritoneal carcinoma.  She is here today for an acute visit.     20 minutes spent on the date of the encounter doing chart review, history and exam, documentation, and further activities as noted above.      Plan:     1.) Peritoneal cancer:  BRIGIDO on exam.  Provided reassurance that exam was normal and symptoms are not concerning for a recurrence of her peritoneal  cancer or a lymphoma/leukemia from her olaparib.  No chest wall or axillar abnormalities on exam to suggest breast cancer.  Labs from a couple week ago are also very reassuring.   Appears DrAmos Kendalljavad wanted to see her next month but do to her family dynamics she will be traveling home to [place], but does return periodically.  RTC when able to present for an in person visit ( Emilianoshayla can not do a virtual visit while she is in [place]).  Reviewed signs and symptoms for when she should contact the clinic or seek additional care.  Patient to contact the clinic with any questions or concerns in the interim.      Genetic risk factors were assessed and she underwent testin 12/27/16 with [facility] and was positive for a pathologic BRCA2 variant, BRCA2 ([id number]).  She was negative for pathologic mutation in APC, ANGEL, AXIN2, BARD1, BMPR1A, BRCA1, BRIP1, CDH1, CDK4, CDKN2A, CHEK2, EPCAM, FANCC, GREM1, MLH1, MSH2, MSH6, MUTYH, NBN, PALB2, PMS2, POLD1, POLE, PTEN, RAD51C, RAD51D, SMAD4, STK11, TP53, VHL, and XRCC2 genes.    Labs and/or tests ordered include:  None.     2.) Health maintenance:  Issues addressed today include following up with PCP for annual health maintenance and non-gynecologic issues.       Sally Jerez, DNP, APRN, FNP-C  Nurse Practitioner  Division of Gynecologic Oncology  Pager: [phone]     CC  Patient Care Team:  Homa Cruz as PCP - General (Internal Medicine)  Chata Ponce MD as MD (Gynecologic Oncology)  Chata Ponce MD as Assigned Cancer Care Provider

## 2023-08-16 NOTE — NURSING NOTE
Oncology Rooming Note    August 16, 2023 12:37 PM   Zahida Rangel is a 62 year old female who presents for:    Chief Complaint   Patient presents with    Oncology Clinic Visit     Peritoneum CA     Initial Vitals: /87   Pulse 79   Temp 98  F (36.7  C) (Oral)   LMP  (LMP Unknown)   SpO2 99%  There is no height or weight on file to calculate BMI. There is no height or weight on file to calculate BSA.  Mild Pain (3) Comment: Data Unavailable   No LMP recorded (lmp unknown). Patient has had a hysterectomy.  Allergies reviewed: Yes  Medications reviewed: Yes    Medications: Medication refills not needed today.  Pharmacy name entered into [facility]:    [id number] - [place], [place] - [address] [facility] [address] [place], [place] - [address]    Clinical concerns:        Kia Solis

## 2023-09-06 ENCOUNTER — PATIENT OUTREACH (OUTPATIENT)
Dept: ONCOLOGY | Facility: CLINIC | Age: 63
End: 2023-09-06
Payer: COMMERCIAL

## 2023-09-06 NOTE — PROGRESS NOTES
Patient reached out and left a VM stating she potentially would like a return appointment at the end of September. Patient requested a return call to review     RN reached out but was unable to reach the patient. VM left     Mine Kelly RN

## 2023-09-28 ENCOUNTER — TELEPHONE (OUTPATIENT)
Dept: ONCOLOGY | Facility: CLINIC | Age: 63
End: 2023-09-28
Payer: COMMERCIAL

## 2023-09-28 ENCOUNTER — TRANSFERRED RECORDS (OUTPATIENT)
Dept: HEALTH INFORMATION MANAGEMENT | Facility: CLINIC | Age: 63
End: 2023-09-28
Payer: COMMERCIAL

## 2023-09-28 NOTE — TELEPHONE ENCOUNTER
Patient calls regarding follow up appointment with Dr. Ponce. She was last seen in clinic 8/16/23 with Sally Jerez CNP and was to follow up with Dr. Ponce in September. Patient reports that no one has called her to schedule and she has tried calling and gotten no response. She currently made an appointment for December. Patient is in [place], she was going to come to [place] for her daughter having surgery but she now has covid-19.    Appointment was scheduled on 10/3/23.    Arina Jerome RN

## 2023-10-03 ENCOUNTER — VIRTUAL VISIT (OUTPATIENT)
Dept: ONCOLOGY | Facility: CLINIC | Age: 63
End: 2023-10-03
Attending: OBSTETRICS & GYNECOLOGY
Payer: COMMERCIAL

## 2023-10-03 VITALS — WEIGHT: 145 LBS | HEIGHT: 65 IN | BODY MASS INDEX: 24.16 KG/M2

## 2023-10-03 DIAGNOSIS — C48.2 PRIMARY CANCER OF PERITONEUM (H): Primary | ICD-10-CM

## 2023-10-03 PROCEDURE — 99214 OFFICE O/P EST MOD 30 MIN: CPT | Mod: VID | Performed by: OBSTETRICS & GYNECOLOGY

## 2023-10-03 ASSESSMENT — PAIN SCALES - GENERAL: PAINLEVEL: NO PAIN (0)

## 2023-10-03 NOTE — PROGRESS NOTES
Virtual Visit Details    Type of service:  Video Visit   Originating Location (pt. Location): Home    Distant Location (provider location):  On-site  Platform used for Video Visit: Anna

## 2023-10-03 NOTE — PATIENT INSTRUCTIONS
Continue labs monthly,  in December    Continue PARP inhibitor    Follow-up as scheduled in December    Chata Ponce MD  Gynecologic Oncology  [facility]

## 2023-10-03 NOTE — PROGRESS NOTES
Consult Notes on Referred Patient    Date: 10/3/2023     Patient is seen today for follow-up, video visit       Her history is as follows:    2-26-16: Referred to Gyn Onc for evaluation of cervical fibroid with prominent vascularity. CA-125 39.     3-11-16: Pap smear normal. Repeat  33. MRI pelvis showed 3.6 x 3.2 x 3cm mass right side of cervix, most likley a leiomyoma. EMS normal thickness. Small cystic area in cul de sac measuring 1.5cm, possible peritoneal inclusion cyst. Recommended repeat imaging in three months    6-15-16:  22. MRI pelvis with 2.7 x 3.0 x 3.3cm mass right posterolateral aspect of cervix, consistent with fibroid and similar to prior MRI. EMS 4mm. No further Gyn Onc follow-up recommended.    11/29 to 11/30/16: Admitted to [facility] with abdominal pain. CT abd/pelvis with peritoneal carcinomatosis. Fullness left ovary. Pelvic US with normal uterus, ES 7.4mm. Hypoechoic mass cervix measuring 3.2 x 2.8cm, ovaries normal, no free fluid.  53. Underwent CT guided biopsy of omental area showing neoplasm of Muellerian origin, difficult to characterize due to small amount of tissue.    12/12/16: Exploratory laparotomy, modified radical hysterectomy, bilateral salpingo-oophorectomy, bilateral pelvic and para-aortic lymphadenectomy, omentectomy, pelvic washings, intraperitoneal port placement, proctoscopy. Operative findings were notable for very small volume disease limited to pelvis, no obvious lesions in omentum or upper abdomen. R0 resected to no visible disease. 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 +Cytology. 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    The patient returns for follow-up. She was seen by [place] by Dr. Bella for second opinion regarding chemotherapy. Dose dense as well as IV/IP were  discussed.     1/12/17: Cycle #1 IV/IP Cisplatin and Taxol. Reaction to IV taxol, able to tolerate after slow infusion. Steroids premed for IP taxol, tolerated.  40    2/2/17: Cycle #2 IV/IP Cisplatin and Taxol.  30    2/23/17: Cycle #3 IV/IP Cisplatin and Taxol.  20    3/16/17: Cycle #4 IV/IP Cisplatin and Taxol.  18    4/6/17: Cycle #5 IV/IP Cisplatin and Taxol.  17    4/28/17: Cycle #6 IV/IP Cisplatin and Taxol.  17. Day 8 held due to anemia. Transfused 1 unit. Day 8 given on 5/12/17.    5/30/17: PET scan with no evidence of disease. 15 x 7 cm lymphocele on left, 8 x 4cm on right.    Underwent bilateral drain placements to treat lymphoceles. Complicated by infection with abscess development. Sclerotherapy performed and drains removed on 7/10/17. Had IP port removed on 7/20/17.    9/15/17: Complained of RLQ pain. Pelvic US negative.  9    11/27/17:  10    12/21/17:  9    3/28/18:  8 PET/CT on 3/29/18 BRIGIDO    4/10/18: CT A/P for RLQ pain BRIGIDO. US Appendix with no free fluid    4/12/18: CT A/P soft tissue stranding mesenteric fat right anterior abdominal fat    8/24/18: CT A/P for abdominal pain negative.  16    9/14/18: US abd for pain, negative for hernia. Seen in clinic for LLQ pain with associated pain with bladder emptying, feels that something is tethering her bladder to the abdominal wall and holding it there. Has seen Urology with negative workup. Was lying flat and noted a bulge in lower abdomen with associated tenderness. Had US today that was negative for hernia. Declined CT scan and cannot have MRI due to implants.     9/28/18:  82    10/2/18: CT A/P for ongoing pain. Multipled dilated loops for bowel with possible ileitis.    10/18/18:  62    11/19/18:  104    11/21/18: PET with recurrent peritoneal carcinomatosis. 1.2cm nodule mid abdomen, 1.2cm nodule sigmoid colon    11/28/18: CT biopsy Path showed recurrent high grade  carcinoma. Discussed salvage chemotherapy +/- PARPi with plans for possible surgery    12/5/18: C1D1 Carbo/Doxil. First cycle difficult, appeal ongoing for PARPi.  168    12/14/18: Seen in clinic. Plan for surgery after second rather than first cycle, coordinate with breast surgery.    1/4/19: C2D1 Carbo/Doxil. Hold on PARP due to counts, not wanting to potentially delay surgery.  90    1/28/19: PET CT with complete response, no signs of disease. Decision not to proceed with surgery due to CR.    2/1/19: C3D1 Carbo/Doxil. Continue to hold on PARP.  30     3/1/19: C4D1 Carbo/Doxil. Neulasta.  13    3/29/19: C5D1 Carbo/Doxil. Neulasta  10    4/23/19:  10    4/26/19: C6D1 Carbo/Doxil. Neulasta. Patient wanted to proceed with breast surgery ASAP.    6/10/19: Breast reconstruction done on 6/10/19.     6/25/19: PARPi Olaparib started 300mg BID    7/11/19: Dose reduced after two weeks due to significant fatigue. 200mg BID.     7/19/19: Some frustration with how she is feeling on the PARPi. Perhaps slightly better at lower dose but still dosen't like it. Continued at 200mg BID.    8/30/19: BRIGIDO, remains on 200mg BID of PARPi    10/11/19: Stable, remains on 200mg BID of PARPi,  7    12/20/19: Routine visit. Significant fatigue. PARP increased to 150mg BID at patient request for one month.    1/24/20: Developed breast cellulitis and implant infection while in [place]     1/30/20: Infected implant removed.  7    Increased PARPi to 200mg BID at end of March.    3/26/20: Labs in [place].  7    4/17/20: Phone visit. Back from [place]. Some occassional pelvic/pubic bone cramping. Due to ongoing fatigue, decreased PARPi to 150mg AM and 200mg in PM.  9    7/24/20: Due to ongoing fatigue, has dropped to 150mg BID, remains compliant    8/6/20:  8, Labs WNL. Crt 1.36    8/21/20: Seen in clinic. BRIGIDO on exam. Continue PARPi at 150mg BID    9/11/20: Patient seen today for unscheduled  visit. Had episode of bleeding x 1 on 9/8/20. Exam showed periurethral laceration. Continue PARP. Labs in [place].  9    3/9/21 -  7 Remains on PARP 150mg BID    7/7/21:  6    9/17/21:  9    12/13/21:  12 Remains on PARP 150mg BID    3/7/22:  8    5/11/22:  10 Remains on PARP 150mg BID    6/23/22:  11 Remains on PARP 150mg BID    Stopped PARP August 25 due to some issues with fluid retention.    Was off PARP until October 2022.     10/25/22:   11.  PARP resumed at 100mg BID     12/22:  Stable.  PARP 100mg BID     3/17/23:  Stable.  PARP 100mg BID     6/13/23:  Labs reviewed.   8, Cr 1.36     Patient seen today for evaluation.  Doing well, remains on PARP.     Stressed because her daughter is having surgery tomorrow.  Zahida feels anxious about this. Bought a house in [facility].  Just had COVID    Sept 28 (per patient) Cr 1.29, EGFR 47, Glucose 138, ALT 47, CBC Hgb 15.3 otherwise normal.  8 (stable)        Past Medical History:    No past medical history on file.      Past Surgical History:    No past surgical history on file.      Health Maintenance:  Health Maintenance Due   Topic Date Due    ADVANCE CARE PLANNING  Never done    COVID-19 Vaccine (1) Never done    COLORECTAL CANCER SCREENING  Never done    HIV SCREENING  Never done    HEPATITIS C SCREENING  Never done    ZOSTER IMMUNIZATION (1 of 2) Never done    PAP  Never done    LIPID  Never done    MAMMO SCREENING  06/24/2018    PHQ-2 (once per calendar year)  Never done    INFLUENZA VACCINE (1) 09/01/2023    MEDICARE ANNUAL WELLNESS VISIT  10/25/2023         Current Medications:     has a current medication list which includes the following prescription(s): albuterol, alpha-lipoic acid, calcium carbonate, cetirizine, cholecalciferol, clonazepam, collagen hydrolysate, eszopiclone, george (zingiber officinalis), lorazepam, magnesium bisglycinate, melatonin, multivitamin w/minerals, olaparib, prednisone,  "triamcinolone, turmeric, and zolpidem.       Allergies:     Dilaudid [hydromorphone] and Penicillins        Social History:     Social History     Tobacco Use    Smoking status: Never    Smokeless tobacco: Never   Substance Use Topics    Alcohol use: Not on file       History   Drug Use Not on file           Family History:     The patient's family history is notable for :.    No family history on file.      Physical Exam:     Ht 1.651 m (5' 5\")   Wt 65.8 kg (145 lb)   LMP  (LMP Unknown)   BMI 24.13 kg/m    Body mass index is 24.13 kg/m .    General Appearance: healthy and alert, no distress       Assessment:     62yo with history of recurrent primary peritoneal cancer, now on PARP.    Total time for the day of visit was 30 minutes including, but not limited to, non-face-to-face time spent reviewing records, counseling, and coordination of care.    TT video 22 min    Plan:       1.) Recurrent platinum sensitive primary peritoneal cancer: Completed two cycles with PET showing complete response. Had previously discussed possible secondary surgery given small volume disease with tentative plan had been for surgery in early January 2019, after first cycle, and then changed to surgery after her second cycle to allow for better planning with her breast surgeon to get expanders removed and implants placed.     Given complete response based on PET, decision was made not to proceed with surgery as I felt there was minimal to no benefit in undergoing a potentially complex surgical exploration with no obvious signs of disease on imaging but with potential significant delays in chemo if she has a complication.    Therefore, recommended completing six cycles of chemo and then PARPi maintenance. She completed six cycles. She then completed her breast surgery and started PARPi, olaparib, in June 2019. Dose reduction (300BID to 200BID) after two weeks due to fatigue, very small decrease in Hgb and Cr slightly elevated. Dose " decreased to 150mg BID in 12/2019 due to fatigue and negative impact on QOL. Stayed at 150mg BID for about three months and then back at 200mg BID and tolerating well. In April 2020, struggling again with fatigue so dropped to PARPi to 150mg in AM, 200mg in PM to ensure continuation and then to 150mg BID which she maintained from July 2020 to August 2022.      Was on a short break from PARP from August 2022 to October 2022 at which time she resumed at 100mg BID.  Discussed risks of long term PARP, SOLO study with 22% of patients on PARP for >5 years. Up to 8% risk of AML. Patient has benefited from PARP. Risks and benefits of continued use discussed.      Informed decision made to continue PARP.  Zahida remains stable, no new symptoms.  stable.  Tolerating PARP at 100mg BID.  Otherwise, continue monthly labs.  She is getting these in [place]. Repeat  in three months.      Return in three months with labs and .      Chata Ponce MD  Gynecologic Oncology  [facility]      CC  Patient Care Team:  Homa Cruz as PCP - General (Internal Medicine)  Chata Ponce MD as MD (Gynecologic Oncology)  Sally Jerez APRN CNP as Assigned Cancer Care Provider  SELF, REFERRED

## 2023-10-03 NOTE — Clinical Note
"    10/3/2023         RE: Zahida Rangel  [address]  [address]  [facility] [address]        Dear Colleague,    Thank you for referring your patient, Zahida Rangel, to the [facility]. Please see a copy of my visit note below.    Virtual Visit Details    Type of service:  Video Visit   Video Start Time: {video visit start/end time for provider to select:610778}  Video End Time:{video visit start/end time for provider to select:954605}    Originating Location (pt. Location): {video visit patient location:577594::\"Home\"}  {PROVIDER LOCATION On-site should be selected for visits conducted from your clinic location or adjoining [facility] hospital, academic office, or other nearby [facility] building. Off-site should be selected for all other provider locations, including home:793454}  Distant Location (provider location):  {virtual location provider:707134}  Platform used for Video Visit: {Virtual Visit Platforms:891223::\"Sermo\"}                            Consult Notes on Referred Patient    Date: 10/3/2023     Patient is seen today for follow-up, transfer of care from [facility].         Her history is as follows:    2-26-16: Referred to Gyn Onc for evaluation of cervical fibroid with prominent vascularity. CA-125 39.     3-11-16: Pap smear normal. Repeat  33. MRI pelvis showed 3.6 x 3.2 x 3cm mass right side of cervix, most likley a leiomyoma. EMS normal thickness. Small cystic area in cul de sac measuring 1.5cm, possible peritoneal inclusion cyst. Recommended repeat imaging in three months    6-15-16:  22. MRI pelvis with 2.7 x 3.0 x 3.3cm mass right posterolateral aspect of cervix, consistent with fibroid and similar to prior MRI. EMS 4mm. No further Gyn Onc follow-up recommended.    11/29 to 11/30/16: Admitted to [facility] with abdominal pain. CT abd/pelvis with peritoneal carcinomatosis. Fullness left ovary. Pelvic US with normal uterus, ES 7.4mm. Hypoechoic mass cervix measuring 3.2 x " 2.8cm, ovaries normal, no free fluid.  53. Underwent CT guided biopsy of omental area showing neoplasm of Muellerian origin, difficult to characterize due to small amount of tissue.    12/12/16: Exploratory laparotomy, modified radical hysterectomy, bilateral salpingo-oophorectomy, bilateral pelvic and para-aortic lymphadenectomy, omentectomy, pelvic washings, intraperitoneal port placement, proctoscopy. Operative findings were notable for very small volume disease limited to pelvis, no obvious lesions in omentum or upper abdomen. R0 resected to no visible disease. 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 +Cytology. 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    The patient returns for follow-up. She was seen by [place] by Dr. Bella for second opinion regarding chemotherapy. Dose dense as well as IV/IP were discussed.     1/12/17: Cycle #1 IV/IP Cisplatin and Taxol. Reaction to IV taxol, able to tolerate after slow infusion. Steroids premed for IP taxol, tolerated.  40    2/2/17: Cycle #2 IV/IP Cisplatin and Taxol.  30    2/23/17: Cycle #3 IV/IP Cisplatin and Taxol.  20    3/16/17: Cycle #4 IV/IP Cisplatin and Taxol.  18    4/6/17: Cycle #5 IV/IP Cisplatin and Taxol.  17    4/28/17: Cycle #6 IV/IP Cisplatin and Taxol.  17. Day 8 held due to anemia. Transfused 1 unit. Day 8 given on 5/12/17.    5/30/17: PET scan with no evidence of disease. 15 x 7 cm lymphocele on left, 8 x 4cm on right.    Underwent bilateral drain placements to treat lymphoceles. Complicated by infection with abscess development. Sclerotherapy performed and drains removed on 7/10/17. Had IP port removed on 7/20/17.    9/15/17: Complained of RLQ pain. Pelvic US negative.  9    11/27/17:  10    12/21/17:  9    3/28/18:  8 PET/CT on  3/29/18 BRIGIDO    4/10/18: CT A/P for RLQ pain BRIGIDO. US Appendix with no free fluid    4/12/18: CT A/P soft tissue stranding mesenteric fat right anterior abdominal fat    8/24/18: CT A/P for abdominal pain negative.  16    9/14/18: US abd for pain, negative for hernia. Seen in clinic for LLQ pain with associated pain with bladder emptying, feels that something is tethering her bladder to the abdominal wall and holding it there. Has seen Urology with negative workup. Was lying flat and noted a bulge in lower abdomen with associated tenderness. Had US today that was negative for hernia. Declined CT scan and cannot have MRI due to implants.     9/28/18:  82    10/2/18: CT A/P for ongoing pain. Multipled dilated loops for bowel with possible ileitis.    10/18/18:  62    11/19/18:  104    11/21/18: PET with recurrent peritoneal carcinomatosis. 1.2cm nodule mid abdomen, 1.2cm nodule sigmoid colon    11/28/18: CT biopsy Path showed recurrent high grade carcinoma. Discussed salvage chemotherapy +/- PARPi with plans for possible surgery    12/5/18: C1D1 Carbo/Doxil. First cycle difficult, appeal ongoing for PARPi.  168    12/14/18: Seen in clinic. Plan for surgery after second rather than first cycle, coordinate with breast surgery.    1/4/19: C2D1 Carbo/Doxil. Hold on PARP due to counts, not wanting to potentially delay surgery.  90    1/28/19: PET CT with complete response, no signs of disease. Decision not to proceed with surgery due to CR.    2/1/19: C3D1 Carbo/Doxil. Continue to hold on PARP.  30     3/1/19: C4D1 Carbo/Doxil. Neulasta.  13    3/29/19: C5D1 Carbo/Doxil. Neulasta  10    4/23/19:  10    4/26/19: C6D1 Carbo/Doxil. Neulasta. Patient wanted to proceed with breast surgery ASAP.    6/10/19: Breast reconstruction done on 6/10/19.     6/25/19: PARPi Olaparib started 300mg BID    7/11/19: Dose reduced after two weeks due to significant fatigue. 200mg BID.      7/19/19: Some frustration with how she is feeling on the PARPi. Perhaps slightly better at lower dose but still dosen't like it. Continued at 200mg BID.    8/30/19: BRIGIDO, remains on 200mg BID of PARPi    10/11/19: Stable, remains on 200mg BID of PARPi,  7    12/20/19: Routine visit. Significant fatigue. PARP increased to 150mg BID at patient request for one month.    1/24/20: Developed breast cellulitis and implant infection while in [place]     1/30/20: Infected implant removed.  7    Increased PARPi to 200mg BID at end of March.    3/26/20: Labs in [place].  7    4/17/20: Phone visit. Back from [place]. Some occassional pelvic/pubic bone cramping. Due to ongoing fatigue, decreased PARPi to 150mg AM and 200mg in PM.  9    7/24/20: Due to ongoing fatigue, has dropped to 150mg BID, remains compliant    8/6/20:  8, Labs WNL. Crt 1.36    8/21/20: Seen in clinic. BRIGDIO on exam. Continue PARPi at 150mg BID    9/11/20: Patient seen today for unscheduled visit. Had episode of bleeding x 1 on 9/8/20. Exam showed periurethral laceration. Continue PARP. Labs in [place].  9    3/9/21 -  7 Remains on PARP 150mg BID    7/7/21:  6    9/17/21:  9    12/13/21:  12 Remains on PARP 150mg BID    3/7/22:  8    5/11/22:  10 Remains on PARP 150mg BID    6/23/22:  11 Remains on PARP 150mg BID    Stopped PARP August 25 due to some issues with fluid retention.    Was off PARP until October 2022.     10/25/22:   11.  PARP resumed at 100mg BID     12/22:  Stable.  PARP 100mg BID     3/17/23:  Stable.  PARP 100mg BID     6/13/23:  Labs reviewed.   8, Cr 1.36     Patient seen today for evaluation.  Doing well, remains on PARP.  Visiting [place] for a period of time.  Otherwise, continues to live in [place].  No new symptoms.      Stressed because her daughter is having surgery tomorrow.  Zahida feels anxious about this. Bought a house in [facility].  Just had COVID    Sept 28 (per  "patient) Cr 1.29, EGFR 47, Glucose 138, ALT 47, CBC Hgb 15.3 otherwise normal.  8 (stable)        Past Medical History:    No past medical history on file.      Past Surgical History:    No past surgical history on file.      Health Maintenance:  Health Maintenance Due   Topic Date Due     ADVANCE CARE PLANNING  Never done     COVID-19 Vaccine (1) Never done     COLORECTAL CANCER SCREENING  Never done     HIV SCREENING  Never done     HEPATITIS C SCREENING  Never done     ZOSTER IMMUNIZATION (1 of 2) Never done     PAP  Never done     LIPID  Never done     MAMMO SCREENING  06/24/2018     PHQ-2 (once per calendar year)  Never done     INFLUENZA VACCINE (1) 09/01/2023     MEDICARE ANNUAL WELLNESS VISIT  10/25/2023         Current Medications:     has a current medication list which includes the following prescription(s): albuterol, alpha-lipoic acid, calcium carbonate, cetirizine, cholecalciferol, clonazepam, collagen hydrolysate, eszopiclone, george (zingiber officinalis), lorazepam, magnesium bisglycinate, melatonin, multivitamin w/minerals, olaparib, prednisone, triamcinolone, turmeric, and zolpidem.       Allergies:     Dilaudid [hydromorphone] and Penicillins        Social History:     Social History     Tobacco Use     Smoking status: Never     Smokeless tobacco: Never   Substance Use Topics     Alcohol use: Not on file       History   Drug Use Not on file           Family History:     The patient's family history is notable for :.    No family history on file.      Physical Exam:     Ht 1.651 m (5' 5\")   Wt 65.8 kg (145 lb)   LMP  (LMP Unknown)   BMI 24.13 kg/m    Body mass index is 24.13 kg/m .    General Appearance: healthy and alert, no distress       Assessment:     63yo with history of recurrent primary peritoneal cancer, now on PARP.    Total time for the day of visit was 30 minutes including, but not limited to, non-face-to-face time spent reviewing records, counseling, and coordination of " care.    Plan:       1.) Recurrent platinum sensitive primary peritoneal cancer: Completed two cycles with PET showing complete response. Had previously discussed possible secondary surgery given small volume disease with tentative plan had been for surgery in early January 2019, after first cycle, and then changed to surgery after her second cycle to allow for better planning with her breast surgeon to get expanders removed and implants placed.     Given complete response based on PET, decision was made not to proceed with surgery as I felt there was minimal to no benefit in undergoing a potentially complex surgical exploration with no obvious signs of disease on imaging but with potential significant delays in chemo if she has a complication.    Therefore, recommended completing six cycles of chemo and then PARPi maintenance. She completed six cycles. She then completed her breast surgery and started PARPi, olaparib, in June 2019. Dose reduction (300BID to 200BID) after two weeks due to fatigue, very small decrease in Hgb and Cr slightly elevated. Dose decreased to 150mg BID in 12/2019 due to fatigue and negative impact on QOL. Stayed at 150mg BID for about three months and then back at 200mg BID and tolerating well. In April 2020, struggling again with fatigue so dropped to PARPi to 150mg in AM, 200mg in PM to ensure continuation and then to 150mg BID which she maintained from July 2020 to August 2022.      Was on a short break from PARP from August 2022 to October 2022 at which time she resumed at 100mg BID.  Discussed risks of long term PARP, SOLO study with 22% of patients on PARP for >5 years. Up to 8% risk of AML. Patient has benefited from PARP. Risks and benefits of continued use discussed.      Informed decision made to continue PARP.  Zahida remains stable, no new symptoms.  stable.  Tolerating PARP at 100mg BID.  Her Cr is slightly elevated over her baseline on recent labs and I would like her to  get this rechecked in a couple of weeks.  Otherwise, continue monthly labs.  Repeat  in three months.      Return in three months with labs and .      Chata Ponce MD  Gynecologic Oncology  [facility]      CC  Patient Care Team:  Homa Cruz as PCP - General (Internal Medicine)  Chata Ponce MD as MD (Gynecologic Oncology)  Sally Jerez APRN CNP as Assigned Cancer Care Provider  SELF, REFERRED          Again, thank you for allowing me to participate in the care of your patient.        Sincerely,        Chata Ponce MD

## 2023-10-03 NOTE — NURSING NOTE
Is the patient currently in the state of [place]? YES    Visit mode:VIDEO    If the visit is dropped, the patient can be reconnected by: VIDEO VISIT: Text to cell phone:   Telephone Information:   Mobile [phone]       Will anyone else be joining the visit? NO  (If patient encounters technical issues they should call [phone])    How would you like to obtain your AVS? Mail a copy    Are changes needed to the allergy or medication list? Pt stated no changes to allergies and Pt stated no med changes    Reason for visit: RECHCONI Stout VVF

## 2023-11-06 ENCOUNTER — TRANSFERRED RECORDS (OUTPATIENT)
Dept: HEALTH INFORMATION MANAGEMENT | Facility: CLINIC | Age: 63
End: 2023-11-06
Payer: COMMERCIAL

## 2023-11-10 ENCOUNTER — DOCUMENTATION ONLY (OUTPATIENT)
Dept: ONCOLOGY | Facility: CLINIC | Age: 63
End: 2023-11-10
Payer: COMMERCIAL

## 2023-11-10 NOTE — PROGRESS NOTES
Oral Chemotherapy Monitoring Program     Received request in Oral Chemotherapy fax for new prescription of olaparib for this patient. Patient is not currently followed by the OC team here, and it looks [facility]    Will send inbasket to Dr. Ponce and Pau Kelly to clarify whether this patient should be followed by our team and whether they are eligible for long term stable follow-up (management of refills primarily).        Kayli Beaulieu, PharmD, BCOP  Oral Chemotherapy Monitoring Program  [facility]  [phone]

## 2023-11-28 ENCOUNTER — TELEPHONE (OUTPATIENT)
Dept: ONCOLOGY | Facility: CLINIC | Age: 63
End: 2023-11-28
Payer: COMMERCIAL

## 2023-11-28 DIAGNOSIS — Z15.09 BRCA2 GENE MUTATION POSITIVE: ICD-10-CM

## 2023-11-28 DIAGNOSIS — Z15.01 BRCA2 GENE MUTATION POSITIVE: ICD-10-CM

## 2023-11-28 DIAGNOSIS — C48.2 PRIMARY CANCER OF PERITONEUM (H): Primary | ICD-10-CM

## 2023-11-28 NOTE — ORAL ONC MGMT
Called to let Zahida know refill of olaparib has been sent to [facility] pharmacy.    Ginny Mendez, PharmD, [facility]  Oral Chemotherapy Monitoring Program  [facility]  [phone]

## 2023-12-22 NOTE — TELEPHONE ENCOUNTER
FREE DRUG APPLICATION APPROVED    Medication: LYNPARZA 150 MG PO TABS  Program Name:  AZ&Me  Effective Date: 1/1/2024  Expiration Date: 12/31/2024  [place] [facility] [place] [address]  Patient Notified: Yes  Additional Information:     Spoke with Amber at [facility] who advised the patient has been approved through 12/31/2024. Amber also advised that patient has 11 active refills on file.     Genia Zhu CPhTOncology Pharmacy Liaison     [facility]  [address] [place] [id number]  Office: [phone]  Fax: [phone]  [email or web address]

## 2024-01-04 ENCOUNTER — TRANSFERRED RECORDS (OUTPATIENT)
Dept: HEALTH INFORMATION MANAGEMENT | Facility: CLINIC | Age: 64
End: 2024-01-04
Payer: COMMERCIAL

## 2024-01-05 ENCOUNTER — LAB (OUTPATIENT)
Dept: LAB | Facility: CLINIC | Age: 64
End: 2024-01-05
Payer: COMMERCIAL

## 2024-01-05 ENCOUNTER — PATIENT OUTREACH (OUTPATIENT)
Dept: ONCOLOGY | Facility: CLINIC | Age: 64
End: 2024-01-05
Payer: COMMERCIAL

## 2024-01-05 DIAGNOSIS — C48.2 PRIMARY CANCER OF PERITONEUM (H): ICD-10-CM

## 2024-01-05 DIAGNOSIS — Z15.09 BRCA2 GENE MUTATION POSITIVE: ICD-10-CM

## 2024-01-05 DIAGNOSIS — Z15.01 BRCA2 GENE MUTATION POSITIVE: ICD-10-CM

## 2024-01-05 DIAGNOSIS — C48.2 PRIMARY CANCER OF PERITONEUM (H): Primary | ICD-10-CM

## 2024-01-05 LAB
ALBUMIN SERPL BCG-MCNC: 4.4 G/DL (ref 3.5–5.2)
ALP SERPL-CCNC: 77 U/L (ref 40–150)
ALT SERPL W P-5'-P-CCNC: 33 U/L (ref 0–50)
ANION GAP SERPL CALCULATED.3IONS-SCNC: 8 MMOL/L (ref 7–15)
AST SERPL W P-5'-P-CCNC: 28 U/L (ref 0–45)
BASOPHILS # BLD AUTO: 0 10E3/UL (ref 0–0.2)
BASOPHILS NFR BLD AUTO: 0 %
BILIRUB SERPL-MCNC: 0.4 MG/DL
BUN SERPL-MCNC: 27.3 MG/DL (ref 8–23)
CALCIUM SERPL-MCNC: 9.9 MG/DL (ref 8.8–10.2)
CHLORIDE SERPL-SCNC: 103 MMOL/L (ref 98–107)
CREAT SERPL-MCNC: 1.35 MG/DL (ref 0.51–0.95)
DEPRECATED HCO3 PLAS-SCNC: 30 MMOL/L (ref 22–29)
EGFRCR SERPLBLD CKD-EPI 2021: 44 ML/MIN/1.73M2
EOSINOPHIL # BLD AUTO: 0.3 10E3/UL (ref 0–0.7)
EOSINOPHIL NFR BLD AUTO: 3 %
ERYTHROCYTE [DISTWIDTH] IN BLOOD BY AUTOMATED COUNT: 13 % (ref 10–15)
GLUCOSE SERPL-MCNC: 86 MG/DL (ref 70–99)
HCT VFR BLD AUTO: 43.9 % (ref 35–47)
HGB BLD-MCNC: 14.4 G/DL (ref 11.7–15.7)
IMM GRANULOCYTES # BLD: 0 10E3/UL
IMM GRANULOCYTES NFR BLD: 0 %
LYMPHOCYTES # BLD AUTO: 2.8 10E3/UL (ref 0.8–5.3)
LYMPHOCYTES NFR BLD AUTO: 39 %
MCH RBC QN AUTO: 31.6 PG (ref 26.5–33)
MCHC RBC AUTO-ENTMCNC: 32.8 G/DL (ref 31.5–36.5)
MCV RBC AUTO: 96 FL (ref 78–100)
MONOCYTES # BLD AUTO: 0.6 10E3/UL (ref 0–1.3)
MONOCYTES NFR BLD AUTO: 8 %
NEUTROPHILS # BLD AUTO: 3.6 10E3/UL (ref 1.6–8.3)
NEUTROPHILS NFR BLD AUTO: 49 %
PLATELET # BLD AUTO: 211 10E3/UL (ref 150–450)
POTASSIUM SERPL-SCNC: 4.4 MMOL/L (ref 3.4–5.3)
PROT SERPL-MCNC: 6.9 G/DL (ref 6.4–8.3)
RBC # BLD AUTO: 4.56 10E6/UL (ref 3.8–5.2)
SODIUM SERPL-SCNC: 141 MMOL/L (ref 135–145)
WBC # BLD AUTO: 7.3 10E3/UL (ref 4–11)

## 2024-01-05 PROCEDURE — 85025 COMPLETE CBC W/AUTO DIFF WBC: CPT

## 2024-01-05 PROCEDURE — 36415 COLL VENOUS BLD VENIPUNCTURE: CPT

## 2024-01-05 PROCEDURE — 80053 COMPREHEN METABOLIC PANEL: CPT

## 2024-01-05 NOTE — PROGRESS NOTES
Patient had left  stating she could not get labs drawn other than the  because the orders were .     Patient also stated that she was going to be traveling and did not know when she would be able to get these drawn. She did have  drawn     RN reached out to patient as no fax number on file     Patient does not have fax number and is in [place]. Patient happens to [place] from the [facility]. Patient will stop in and see if this can be done today     Patient will also get fax number when she is back in [place] and call RN back       Patient did say she is dehydrated so her creatinine might be a bit high     Patient appreciative of RN call     Mine Kelly RN

## 2024-01-09 ENCOUNTER — PATIENT OUTREACH (OUTPATIENT)
Dept: ONCOLOGY | Facility: CLINIC | Age: 64
End: 2024-01-09
Payer: COMMERCIAL

## 2024-01-09 NOTE — PROGRESS NOTES
Patient and daughter left  requesting her and her daughters appointments in March be on the same day and back to back     Message sent to scheduling to request/accommodate above request     Mine Kelly RN

## 2024-01-16 ENCOUNTER — PATIENT OUTREACH (OUTPATIENT)
Dept: ONCOLOGY | Facility: CLINIC | Age: 64
End: 2024-01-16
Payer: COMMERCIAL

## 2024-01-16 NOTE — PROGRESS NOTES
Per scheduling: I have not been able to reach this pt or her daughter to schedule the requested back to back appts, should I just keep it there or r/s closer to the appt and mail out ?    Patient and her daughter had called [facility] requesting appointments back to back but neither have been reachable and VM have been left but they have not called back    [facility] approved appts being made and being mailed out to their homes     Mine Kelly RN

## 2024-02-02 ENCOUNTER — PATIENT OUTREACH (OUTPATIENT)
Dept: ONCOLOGY | Facility: CLINIC | Age: 64
End: 2024-02-02
Payer: COMMERCIAL

## 2024-02-02 DIAGNOSIS — C48.2 PRIMARY CANCER OF PERITONEUM (H): Primary | ICD-10-CM

## 2024-02-02 NOTE — PROGRESS NOTES
Patient reached out as she went to get her labs drawn and again they did not have orders (Of note patient did not have fax number in the past and was going to call with this)      Patient wanted this faxed over [facility] and asked how fast RN could do this as if it was going to take to long she was going to just get them drawn next month.     RN stated she would fax this as soon as she could but could not give a time frame on how fast it would go through the system and be processed in the lab     Orders were faxed immediately     Fax number [phone]    Mine Kelly RN

## 2024-02-16 ENCOUNTER — TRANSFERRED RECORDS (OUTPATIENT)
Dept: HEALTH INFORMATION MANAGEMENT | Facility: CLINIC | Age: 64
End: 2024-02-16
Payer: COMMERCIAL

## 2024-02-19 ENCOUNTER — PATIENT OUTREACH (OUTPATIENT)
Dept: ONCOLOGY | Facility: CLINIC | Age: 64
End: 2024-02-19
Payer: COMMERCIAL

## 2024-02-19 NOTE — PROGRESS NOTES
Patient reached out as her local lab did  receive a  order only the CMP and CBC    RN re-faxed the  order    Mine Kelly RN

## 2024-02-26 ENCOUNTER — PATIENT OUTREACH (OUTPATIENT)
Dept: ONCOLOGY | Facility: CLINIC | Age: 64
End: 2024-02-26
Payer: COMMERCIAL

## 2024-02-26 NOTE — PROGRESS NOTES
Patient reached out asking if RN could reach out to Dr Ponce and ask Dr Ponce if she could call the patient     Patient did not want to disclose why she needed MD to call but did say it had to do with upcoming appointment.    Patient did not want to review with RN    Mine Kelly RN

## 2024-03-04 ENCOUNTER — TELEPHONE (OUTPATIENT)
Dept: ONCOLOGY | Facility: CLINIC | Age: 64
End: 2024-03-04
Payer: COMMERCIAL

## 2024-03-04 ENCOUNTER — PATIENT OUTREACH (OUTPATIENT)
Dept: ONCOLOGY | Facility: CLINIC | Age: 64
End: 2024-03-04
Payer: COMMERCIAL

## 2024-03-04 DIAGNOSIS — C48.2 PRIMARY CANCER OF PERITONEUM (H): Primary | ICD-10-CM

## 2024-03-04 NOTE — PROGRESS NOTES
RN received a call from patient     Patient was concerned as she tried to change her appt to virtual and scheduling was told by writer that she needed an in person visit for an exam     Patient was concerned as she has not had an internal exam in over 5 years and that the MD visit only consistent of her and the MD talking and not exams     Patient was concerned that RN would say she needs an exam when that has not been the case     Patient would really like to talk with MD about her appointments and ongoing care    Patient requested MD call at her earliest convenience and states this is getting to be important     RN sent a message to MD Mine Kelly RN

## 2024-03-04 NOTE — PROGRESS NOTES
RN had faxed new standing orders for patient on 2/19     MD ordered labs again and requested RN fax these to her local lab as well     This was completed per request    Mine Kelly RN

## 2024-03-04 NOTE — TELEPHONE ENCOUNTER
Phone call to patient.     She will be in [place] in March. Appt cancelled.  She will get labs locally in mid March    Updated orders placed    Discussed CT CAP in the event  is elevated    Chata Ponce MD  Gynecologic Oncology  [facility]

## 2024-03-17 ENCOUNTER — HEALTH MAINTENANCE LETTER (OUTPATIENT)
Age: 64
End: 2024-03-17

## 2024-03-29 ENCOUNTER — PATIENT OUTREACH (OUTPATIENT)
Dept: ONCOLOGY | Facility: CLINIC | Age: 64
End: 2024-03-29
Payer: COMMERCIAL

## 2024-03-29 ENCOUNTER — TRANSFERRED RECORDS (OUTPATIENT)
Dept: HEALTH INFORMATION MANAGEMENT | Facility: CLINIC | Age: 64
End: 2024-03-29
Payer: COMMERCIAL

## 2024-03-29 LAB
ALT SERPL-CCNC: 29 U/L (ref 6–29)
AST SERPL-CCNC: 23 U/L (ref 10–35)
CREATININE (EXTERNAL): 1.17 MG/DL (ref 0.5–1.05)
GFR ESTIMATED (EXTERNAL): 53 ML/MIN/1.73M2
GLUCOSE (EXTERNAL): 93 MG/DL (ref 65–99)
POTASSIUM (EXTERNAL): 4.8 MMOL/L (ref 3.5–5.3)

## 2024-03-29 NOTE — PROGRESS NOTES
Patient called back and stated there was no diagnosis code on the order that was received    RN was placed on speaker phone and confirmed needed code     Staff at local lab stated they can no longer receive orders via fax as they now realize MD is out of state    Orders will need to be hand brought in and new orders will need to be placed with specific instructions      Patient requesting new orders be mailed to her home so she can hand deliver them     New orders will be placed when MD is in clinic as they will need a signature on them     Once complete will mail to patients home     Address was confirmed     Mine Kelly RN

## 2024-03-29 NOTE — PROGRESS NOTES
Chief Complaint       Fever (Sinus congestion and drainage, fatigue, muscle soreness, started yesterday)    History of Present Illness   Source of history provided by:  patient. Bebeto Lombardo is a 22 y.o. old male presenting to the walk in clinic for evaluation of above symptoms, for x 1-2 days. Denies any diarrhea, nausea CP, dyspnea, LE edema, abdominal pain, vomiting, rash, or lethargy. Hx of asthma (exercise induced) or COPD; denies tobacco use. Patient reports recent sick exposures, including COVID. Patient has not been vaccinated for COVID-19, he had COVID in September. Patient has been taking no OTC for symptomatic relief. [employer]. Able to eat & drink. ROS    Unless otherwise stated in this report or unable to obtain because of the patient's clinical or mental status as evidenced by the medical record, this patients's positive and negative responses for Review of Systems, constitutional, psych, eyes, ENT, cardiovascular, respiratory, gastrointestinal, neurological, genitourinary, musculoskeletal, integument systems and systems related to the presenting problem are either stated in the preceding or were not pertinent or were negative for the symptoms and/or complaints related to the medical problem. Past Medical History:  has a past medical history of ADHD (attention deficit hyperactivity disorder), Asthma, Class 3 severe obesity due to excess calories without serious comorbidity with body mass index (BMI) of 60.0 to 69.9 in [facility]), and ALESSANDRO (obstructive sleep apnea). Past Surgical History:  has no past surgical history on file. Social History:  reports that he has never smoked. He has never used smokeless tobacco. He reports that he does not drink alcohol and does not use drugs. Family History: family history is not on file.    Allergies: Peanut-containing drug products and Shellfish-derived products    Physical Exam         VS:  /82   Temp 97.6 °F (36.4 °C) (Temporal)   Resp Patient reached out stating she was at her local lab and they did not have an order for a      RN verified with patient that  orders were sent with the other orders and if they have them they should have the  order    Patient confirmed they received the other orders and after some discussion they were able to find the  order     Nothing more needed    Mine Kelly RN     18   Ht 5' 9\" (1.753 m)   Wt (!) 402 lb (182.3 kg)   BMI 59.37 kg/m²    Oxygen Saturation Interpretation: Normal.    Constitutional:  Alert, development consistent with age. NAD. Head:  NC/NT. Airway patent. Cerumen noted. Mouth: Posterior pharynx with mild erythema and clear postnasal drip. No tonsillar hypertrophy or exudate. Neck:  Normal ROM. Supple. No anterior cervical adenopathy noted. Lungs: CTAB without wheezes, rales, or rhonchi. CV:  Regular rate and rhythm, normal heart sounds, without pathological murmurs, ectopy, gallops, or rubs. Skin:  Normal turgor. Warm, dry, without visible rash. Lymphatic: No lymphangitis or adenopathy noted. Neurological:  Oriented. Motor functions intact. Lab / Imaging Results   (All laboratory and radiology results have been personally reviewed by myself)  Labs:  Results for orders placed or performed in visit on 07/20/22   POCT COVID-19 Rapid, NAAT   Result Value Ref Range    SARS-COV-2, RdRp gene Positive (A) Negative    Lot Number [id number]     QC Pass/Fail pass        Imaging: All Radiology results interpreted by Radiologist unless otherwise noted. Results for orders placed or performed in visit on 07/20/22   POCT COVID-19 Rapid, NAAT   Result Value Ref Range    SARS-COV-2, RdRp gene Positive (A) Negative    Lot Number [id number]     QC Pass/Fail pass      Assessment / Plan     Impression(s):  Nathalia Ruiz was seen today for fever. Diagnoses and all orders for this visit:    Sinobronchitis  -     Cancel: POCT COVID-19, Antigen  -     POCT COVID-19 Rapid, NAAT    COVID-19  -     amoxicillin-clavulanate (AUGMENTIN) 875-125 MG per tablet; Take 1 tablet by mouth in the morning and 1 tablet before bedtime. Do all this for 7 days. -     predniSONE (DELTASONE) 10 MG tablet; Take 1 tablet by mouth in the morning and 1 tablet before bedtime.  Do all this for 5 days.  -     POCT COVID-19 Rapid, NAAT    - Red Flag items & conservative methods discussed including OTC methods & Coricidin medication  - F/u with PCP if symptoms persist  - Work/school letter provided in AVS if requested      Advised cautionary self-quarantine at home in the interim. Increase fluids and rest. Symptomatic relief discussed including Tylenol prn pain/fever. Schedule virtual f/u with PCP in 7-10 days if symptoms persist. ED sooner if symptoms worsen or change. ED immediately with high or refractory fever, progressive SOB, dyspnea, CP, calf pain/swelling, shaking chills, vomiting, abdominal pain, lethargy, flank pain, or decreased urinary output. Pt verbalizes understanding and is in agreement with plan of care. All questions answered. Sarah Severino, APRN - CNP    **This report was transcribed using voice recognition software. Every effort was made to ensure accuracy; however, inadvertent computerized transcription errors may be present.

## 2024-04-02 DIAGNOSIS — C48.2 PRIMARY CANCER OF PERITONEUM (H): Primary | ICD-10-CM

## 2024-04-02 NOTE — CONFIDENTIAL NOTE
Patient called in needing new orders and they need to be hard copies that are mailed to her home     New orders placed     MD signed     Home addressed verified with patient     Mailed out to patients home    Mine Kelly RN

## 2024-04-16 ENCOUNTER — PATIENT OUTREACH (OUTPATIENT)
Dept: ONCOLOGY | Facility: CLINIC | Age: 64
End: 2024-04-16
Payer: COMMERCIAL

## 2024-04-16 NOTE — PROGRESS NOTES
Patient reached out and stated that she needs a letter of accommodations    Patient states Judi makes her extremely thirsty and she drinks lots of water. Due to this she also goes to the bathroom a lot. 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     Patient will send a [employer] with correct E-mail address     Mine Kelly RN

## 2024-05-14 ENCOUNTER — TRANSFERRED RECORDS (OUTPATIENT)
Dept: HEALTH INFORMATION MANAGEMENT | Facility: CLINIC | Age: 64
End: 2024-05-14
Payer: COMMERCIAL

## 2024-06-04 ENCOUNTER — PATIENT OUTREACH (OUTPATIENT)
Dept: ONCOLOGY | Facility: CLINIC | Age: 64
End: 2024-06-04
Payer: COMMERCIAL

## 2024-06-11 ENCOUNTER — PATIENT OUTREACH (OUTPATIENT)
Dept: ONCOLOGY | Facility: CLINIC | Age: 64
End: 2024-06-11
Payer: COMMERCIAL

## 2024-06-11 NOTE — PROGRESS NOTES
New letter with new name instructions per patient completed, signed by MD and mailed to patients home     Mine Kelly RN

## 2024-06-17 ENCOUNTER — TRANSFERRED RECORDS (OUTPATIENT)
Dept: HEALTH INFORMATION MANAGEMENT | Facility: CLINIC | Age: 64
End: 2024-06-17
Payer: COMMERCIAL

## 2024-07-09 ENCOUNTER — TRANSFERRED RECORDS (OUTPATIENT)
Dept: HEALTH INFORMATION MANAGEMENT | Facility: CLINIC | Age: 64
End: 2024-07-09
Payer: COMMERCIAL

## 2024-07-09 LAB
ALT SERPL-CCNC: 26 U/L (ref 6–29)
AST SERPL-CCNC: 21 U/L (ref 10–35)
CREATININE (EXTERNAL): 1.25 MG/DL (ref 0.5–1.05)
GFR ESTIMATED (EXTERNAL): 49 ML/MIN/1.73M2
GLUCOSE (EXTERNAL): 83 MG/DL (ref 65–99)
POTASSIUM (EXTERNAL): 4.2 MMOL/L (ref 3.5–5.3)

## 2024-09-06 ENCOUNTER — PATIENT OUTREACH (OUTPATIENT)
Dept: ONCOLOGY | Facility: HOSPITAL | Age: 64
End: 2024-09-06
Payer: COMMERCIAL

## 2024-09-06 NOTE — PROGRESS NOTES
Call on nurse triage line from Zahida who reports that she is a long term Dr. Ponce patient and would like to transfer her care to [facility]. Zahida also states that she has some other questions and is hoping a nurse can call her back.    Return call to Zahida. Unable to contact. Left voicemail asking Zahida to return call to clinic when able. Contact information provided.     Please advise.    Notified patient of MD note below.   Patient wants to know what caused her liver enzymes to increase.  Pharmacy is setup for Rx to be sent.  Would you like labs to be placed for future order? Liver panel or CMP?    Thank you.

## 2024-09-09 NOTE — PROGRESS NOTES
Zahida scheduled for appointment with Dr. Ponce 9/26/24 at [facility].     Xiomara Colindres RN  09/09/24  2:57 PM

## 2024-09-12 ENCOUNTER — PATIENT OUTREACH (OUTPATIENT)
Dept: ONCOLOGY | Facility: HOSPITAL | Age: 64
End: 2024-09-12
Payer: COMMERCIAL

## 2024-09-12 NOTE — PROGRESS NOTES
Call from Zahida to follow up on appointment scheduling. Per Zahida, she does not need lab appointment for monthly labs because she has her labs done locally in [place]. Lab appointment at  canceled. Reviewed appointment time and date for next visit with Dr. Ponce. Zahida verbalizes understanding and is agreeable to this plan.     Xiomara Colindres RN  09/12/24  8:24 AM

## 2024-09-26 ENCOUNTER — ONCOLOGY VISIT (OUTPATIENT)
Dept: ONCOLOGY | Facility: HOSPITAL | Age: 64
End: 2024-09-26
Attending: OBSTETRICS & GYNECOLOGY
Payer: COMMERCIAL

## 2024-09-26 ENCOUNTER — LAB (OUTPATIENT)
Dept: INFUSION THERAPY | Facility: HOSPITAL | Age: 64
End: 2024-09-26
Attending: OBSTETRICS & GYNECOLOGY
Payer: COMMERCIAL

## 2024-09-26 VITALS
BODY MASS INDEX: 24.13 KG/M2 | RESPIRATION RATE: 16 BRPM | HEIGHT: 65 IN | DIASTOLIC BLOOD PRESSURE: 79 MMHG | OXYGEN SATURATION: 98 % | SYSTOLIC BLOOD PRESSURE: 143 MMHG | HEART RATE: 75 BPM

## 2024-09-26 DIAGNOSIS — Z15.01 BRCA2 GENE MUTATION POSITIVE: ICD-10-CM

## 2024-09-26 DIAGNOSIS — C48.2 PRIMARY CANCER OF PERITONEUM (H): Primary | ICD-10-CM

## 2024-09-26 DIAGNOSIS — C48.2 PRIMARY CANCER OF PERITONEUM (H): ICD-10-CM

## 2024-09-26 DIAGNOSIS — Z15.09 BRCA2 GENE MUTATION POSITIVE: ICD-10-CM

## 2024-09-26 LAB
ALBUMIN SERPL BCG-MCNC: 4.3 G/DL (ref 3.5–5.2)
ALP SERPL-CCNC: 81 U/L (ref 40–150)
ALT SERPL W P-5'-P-CCNC: 44 U/L (ref 0–50)
ANION GAP SERPL CALCULATED.3IONS-SCNC: 10 MMOL/L (ref 7–15)
AST SERPL W P-5'-P-CCNC: 33 U/L (ref 0–45)
BASOPHILS # BLD AUTO: 0 10E3/UL (ref 0–0.2)
BASOPHILS NFR BLD AUTO: 1 %
BILIRUB SERPL-MCNC: 0.7 MG/DL
BUN SERPL-MCNC: 9.3 MG/DL (ref 8–23)
CALCIUM SERPL-MCNC: 9.9 MG/DL (ref 8.8–10.4)
CANCER AG125 SERPL-ACNC: 7 U/ML
CHLORIDE SERPL-SCNC: 100 MMOL/L (ref 98–107)
CREAT SERPL-MCNC: 1.18 MG/DL (ref 0.51–0.95)
EGFRCR SERPLBLD CKD-EPI 2021: 52 ML/MIN/1.73M2
EOSINOPHIL # BLD AUTO: 0.2 10E3/UL (ref 0–0.7)
EOSINOPHIL NFR BLD AUTO: 2 %
ERYTHROCYTE [DISTWIDTH] IN BLOOD BY AUTOMATED COUNT: 13.2 % (ref 10–15)
GLUCOSE SERPL-MCNC: 103 MG/DL (ref 70–99)
HCO3 SERPL-SCNC: 27 MMOL/L (ref 22–29)
HCT VFR BLD AUTO: 42.1 % (ref 35–47)
HGB BLD-MCNC: 14.7 G/DL (ref 11.7–15.7)
IMM GRANULOCYTES # BLD: 0 10E3/UL
IMM GRANULOCYTES NFR BLD: 0 %
LYMPHOCYTES # BLD AUTO: 2.7 10E3/UL (ref 0.8–5.3)
LYMPHOCYTES NFR BLD AUTO: 34 %
MCH RBC QN AUTO: 33.1 PG (ref 26.5–33)
MCHC RBC AUTO-ENTMCNC: 34.9 G/DL (ref 31.5–36.5)
MCV RBC AUTO: 95 FL (ref 78–100)
MONOCYTES # BLD AUTO: 0.7 10E3/UL (ref 0–1.3)
MONOCYTES NFR BLD AUTO: 9 %
NEUTROPHILS # BLD AUTO: 4.4 10E3/UL (ref 1.6–8.3)
NEUTROPHILS NFR BLD AUTO: 55 %
NRBC # BLD AUTO: 0 10E3/UL
NRBC BLD AUTO-RTO: 0 /100
PLATELET # BLD AUTO: 197 10E3/UL (ref 150–450)
POTASSIUM SERPL-SCNC: 4.1 MMOL/L (ref 3.4–5.3)
PROT SERPL-MCNC: 6.9 G/DL (ref 6.4–8.3)
RBC # BLD AUTO: 4.44 10E6/UL (ref 3.8–5.2)
SODIUM SERPL-SCNC: 137 MMOL/L (ref 135–145)
WBC # BLD AUTO: 8 10E3/UL (ref 4–11)

## 2024-09-26 PROCEDURE — G0463 HOSPITAL OUTPT CLINIC VISIT: HCPCS | Performed by: OBSTETRICS & GYNECOLOGY

## 2024-09-26 PROCEDURE — 99214 OFFICE O/P EST MOD 30 MIN: CPT | Performed by: OBSTETRICS & GYNECOLOGY

## 2024-09-26 PROCEDURE — 86304 IMMUNOASSAY TUMOR CA 125: CPT

## 2024-09-26 PROCEDURE — 36415 COLL VENOUS BLD VENIPUNCTURE: CPT

## 2024-09-26 PROCEDURE — 85041 AUTOMATED RBC COUNT: CPT

## 2024-09-26 PROCEDURE — 82040 ASSAY OF SERUM ALBUMIN: CPT

## 2024-09-26 PROCEDURE — G2211 COMPLEX E/M VISIT ADD ON: HCPCS | Performed by: OBSTETRICS & GYNECOLOGY

## 2024-09-26 ASSESSMENT — PAIN SCALES - GENERAL: PAINLEVEL: MODERATE PAIN (4)

## 2024-09-26 NOTE — LETTER
"9/26/2024      Zahida Rangel  [address]  [address]  [facility] [address]      Dear Colleague,    Thank you for referring your patient, Zahida Rangel, to the [facility]. Please see a copy of my visit note below.    Oncology Rooming Note    September 26, 2024 12:14 PM   Zahida Rangel is a 64 year old female who presents for:    Chief Complaint   Patient presents with     Oncology Clinic Visit       Primary cancer of peritoneum        Initial Vitals: BP (!) 143/79   Pulse 75   Resp 16   Ht 1.651 m (5' 5\")   LMP  (LMP Unknown)   SpO2 98%   BMI 24.13 kg/m   Estimated body mass index is 24.13 kg/m  as calculated from the following:    Height as of this encounter: 1.651 m (5' 5\").    Weight as of 10/3/23: 65.8 kg (145 lb). Body surface area is 1.74 meters squared.  Moderate Pain (4) Comment: Data Unavailable   No LMP recorded (lmp unknown). Patient has had a hysterectomy.  Allergies reviewed: Yes  Medications reviewed: Yes    Medications: MEDICATION REFILLS NEEDED TODAY. Provider was notified.  Pharmacy name entered into [employer]:    [id number] - [place], [place] - [address] [place] [address] [address]ARM[address] [place] [address] PHARMACY [place], [place] - [address] [place] [address] [phone]  [facility], [place] - [address]    Frailty Screening:   Is the patient here for a new oncology consult visit in cancer care? 2. No      Clinical concerns:  1 year follow up      Melinda Pride                                      Consult Notes on Referred Patient    Date: 9/26/2024     Patient is seen today for follow-up, video visit        Her history is as follows:    2-26-16: Referred to Gyn Onc for evaluation of cervical fibroid with prominent vascularity. CA-125 39.     3-11-16: Pap smear normal. Repeat  33. MRI pelvis showed 3.6 x 3.2 x 3cm mass right side of cervix, most likley a leiomyoma. EMS normal thickness. Small cystic area " in cul de sac measuring 1.5cm, possible peritoneal inclusion cyst. Recommended repeat imaging in three months    6-15-16:  22. MRI pelvis with 2.7 x 3.0 x 3.3cm mass right posterolateral aspect of cervix, consistent with fibroid and similar to prior MRI. EMS 4mm. No further Gyn Onc follow-up recommended.    11/29 to 11/30/16: Admitted to [facility] with abdominal pain. CT abd/pelvis with peritoneal carcinomatosis. Fullness left ovary. Pelvic US with normal uterus, ES 7.4mm. Hypoechoic mass cervix measuring 3.2 x 2.8cm, ovaries normal, no free fluid.  53. Underwent CT guided biopsy of omental area showing neoplasm of Muellerian origin, difficult to characterize due to small amount of tissue.    12/12/16: Exploratory laparotomy, modified radical hysterectomy, bilateral salpingo-oophorectomy, bilateral pelvic and para-aortic lymphadenectomy, omentectomy, pelvic washings, intraperitoneal port placement, proctoscopy. Operative findings were notable for very small volume disease limited to pelvis, no obvious lesions in omentum or upper abdomen. R0 resected to no visible disease. 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 +Cytology. 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    The patient returns for follow-up. She was seen by [facility] by Dr. Bella for second opinion regarding chemotherapy. Dose dense as well as IV/IP were discussed.     1/12/17: Cycle #1 IV/IP Cisplatin and Taxol. Reaction to IV taxol, able to tolerate after slow infusion. Steroids premed for IP taxol, tolerated.  40    2/2/17: Cycle #2 IV/IP Cisplatin and Taxol.  30    2/23/17: Cycle #3 IV/IP Cisplatin and Taxol.  20    3/16/17: Cycle #4 IV/IP Cisplatin and Taxol.  18    4/6/17: Cycle #5 IV/IP Cisplatin and Taxol.  17    4/28/17: Cycle #6 IV/IP Cisplatin and  Taxol.  17. Day 8 held due to anemia. Transfused 1 unit. Day 8 given on 5/12/17.    5/30/17: PET scan with no evidence of disease. 15 x 7 cm lymphocele on left, 8 x 4cm on right.    Underwent bilateral drain placements to treat lymphoceles. Complicated by infection with abscess development. Sclerotherapy performed and drains removed on 7/10/17. Had IP port removed on 7/20/17.    9/15/17: Complained of RLQ pain. Pelvic US negative.  9    11/27/17:  10    12/21/17:  9    3/28/18:  8 PET/CT on 3/29/18 BRIGIDO    4/10/18: CT A/P for RLQ pain BRIGIDO. US Appendix with no free fluid    4/12/18: CT A/P soft tissue stranding mesenteric fat right anterior abdominal fat    8/24/18: CT A/P for abdominal pain negative.  16    9/14/18: US abd for pain, negative for hernia. Seen in clinic for LLQ pain with associated pain with bladder emptying, feels that something is tethering her bladder to the abdominal wall and holding it there. Has seen Urology with negative workup. Was lying flat and noted a bulge in lower abdomen with associated tenderness. Had US today that was negative for hernia. Declined CT scan and cannot have MRI due to implants.     9/28/18:  82    10/2/18: CT A/P for ongoing pain. Multipled dilated loops for bowel with possible ileitis.    10/18/18:  62    11/19/18:  104    11/21/18: PET with recurrent peritoneal carcinomatosis. 1.2cm nodule mid abdomen, 1.2cm nodule sigmoid colon    11/28/18: CT biopsy Path showed recurrent high grade carcinoma. Discussed salvage chemotherapy +/- PARPi with plans for possible surgery    12/5/18: C1D1 Carbo/Doxil. First cycle difficult, appeal ongoing for PARPi.  168    12/14/18: Seen in clinic. Plan for surgery after second rather than first cycle, coordinate with breast surgery.    1/4/19: C2D1 Carbo/Doxil. Hold on PARP due to counts, not wanting to potentially delay surgery.  90    1/28/19: PET CT with complete response,  no signs of disease. Decision not to proceed with surgery due to CR.    2/1/19: C3D1 Carbo/Doxil. Continue to hold on PARP.  30     3/1/19: C4D1 Carbo/Doxil. Neulasta.  13    3/29/19: C5D1 Carbo/Doxil. Neulasta  10    4/23/19:  10    4/26/19: C6D1 Carbo/Doxil. Neulasta. Patient wanted to proceed with breast surgery ASAP.    6/10/19: Breast reconstruction done on 6/10/19.     6/25/19: PARPi Olaparib started 300mg BID    7/11/19: Dose reduced after two weeks due to significant fatigue. 200mg BID.     7/19/19: Some frustration with how she is feeling on the PARPi. Perhaps slightly better at lower dose but still dosen't like it. Continued at 200mg BID.    8/30/19: BRIGIDO, remains on 200mg BID of PARPi    10/11/19: Stable, remains on 200mg BID of PARPi,  7    12/20/19: Routine visit. Significant fatigue. PARP increased to 150mg BID at patient request for one month.    1/24/20: Developed breast cellulitis and implant infection while in [place]     1/30/20: Infected implant removed.  7    Increased PARPi to 200mg BID at end of March.    3/26/20: Labs in [place].  7    4/17/20: Phone visit. Back from [place]. Some occassional pelvic/pubic bone cramping. Due to ongoing fatigue, decreased PARPi to 150mg AM and 200mg in PM.  9    7/24/20: Due to ongoing fatigue, has dropped to 150mg BID, remains compliant    8/6/20:  8, Labs WNL. Crt 1.36    8/21/20: Seen in clinic. BRIGIDO on exam. Continue PARPi at 150mg BID    9/11/20: Patient seen today for unscheduled visit. Had episode of bleeding x 1 on 9/8/20. Exam showed periurethral laceration. Continue PARP. Labs in [place].  9    3/9/21 -  7 Remains on PARP 150mg BID    7/7/21:  6    9/17/21:  9    12/13/21:  12 Remains on PARP 150mg BID    3/7/22:  8    5/11/22:  10 Remains on PARP 150mg BID    6/23/22:  11 Remains on PARP 150mg BID    Stopped PARP August 25 due to some issues with fluid retention.    Was  "off PARP until October 2022.     10/25/22:   11.  PARP resumed at 100mg BID     12/22:  Stable.  PARP 100mg BID     3/17/23:  Stable.  PARP 100mg BID     6/13/23:  Labs reviewed.   8, Cr 1.36      8/21/24:   6    Patient seen today for follow-up.  Living in [place].  Starting masters program in forensic science.  No new symptoms.  Taking Lynparza        Past Medical History:    No past medical history on file.      Past Surgical History:    Past Surgical History:   Procedure Laterality Date     IR CHEST PORT PLACEMENT > 5 YRS OF AGE  1/11/2017         Health Maintenance:  Health Maintenance Due   Topic Date Due     ADVANCE CARE PLANNING  Never done     COVID-19 Vaccine (1) Never done     COLORECTAL CANCER SCREENING  Never done     HIV SCREENING  Never done     HEPATITIS C SCREENING  Never done     ZOSTER IMMUNIZATION (1 of 2) Never done     PAP  Never done     LIPID  Never done     MAMMO SCREENING  07/17/2019     RSV VACCINE (1 - Risk 60-74 years 1-dose series) Never done     PHQ-2 (once per calendar year)  Never done     INFLUENZA VACCINE (1) 09/01/2024         Current Medications:     has a current medication list which includes the following prescription(s): albuterol, alpha-lipoic acid, calcium carbonate, cetirizine, cholecalciferol, clonazepam, collagen hydrolysate, eszopiclone, george (zingiber officinalis), lorazepam, magnesium bisglycinate, melatonin, multivitamin w/minerals, olaparib, olaparib, prednisone, triamcinolone, turmeric, and zolpidem.       Allergies:     Dilaudid [hydromorphone] and Penicillins        Social History:     Social History     Tobacco Use     Smoking status: Never     Smokeless tobacco: Never   Substance Use Topics     Alcohol use: Not on file       History   Drug Use Not on file           Family History:     The patient's family history is notable for :.    No family history on file.      Physical Exam:     BP (!) 143/79   Pulse 75   Resp 16   Ht 1.651 m (5' 5\")   " LMP  (LMP Unknown)   SpO2 98%   BMI 24.13 kg/m    Body mass index is 24.13 kg/m .    General Appearance: healthy and alert, no distress     Assessment:     65yo with history of recurrent primary peritoneal cancer, now on PARP.    Total time for the day of visit was 30 minutes including, but not limited to, non-face-to-face time spent reviewing records, counseling, and coordination of care.     The longitudinal plan of care for the diagnosis(es)/condition(s) as documented were addressed during this visit. Due to the added complexity in care, I will continue to support Zahida in the subsequent management and with ongoing continuity of care.    Plan:       1.) Recurrent platinum sensitive primary peritoneal cancer: Completed two cycles with PET showing complete response. Had previously discussed possible secondary surgery given small volume disease with tentative plan had been for surgery in early January 2019, after first cycle, and then changed to surgery after her second cycle to allow for better planning with her breast surgeon to get expanders removed and implants placed.     Given complete response based on PET, decision was made not to proceed with surgery as I felt there was minimal to no benefit in undergoing a potentially complex surgical exploration with no obvious signs of disease on imaging but with potential significant delays in chemo if she has a complication.    Therefore, recommended completing six cycles of chemo and then PARPi maintenance. She completed six cycles. She then completed her breast surgery and started PARPi, olaparib, in June 2019. Dose reduction (300BID to 200BID) after two weeks due to fatigue, very small decrease in Hgb and Cr slightly elevated. Dose decreased to 150mg BID in 12/2019 due to fatigue and negative impact on QOL. Stayed at 150mg BID for about three months and then back at 200mg BID and tolerating well. In April 2020, struggling again with fatigue so dropped to PARPi to  150mg in AM, 200mg in PM to ensure continuation and then to 150mg BID which she maintained from July 2020 to August 2022.      Was on a short break from PARP from August 2022 to October 2022 at which time she resumed at 100mg BID.  Discussed risks of long term PARP, SOLO study with 22% of patients on PARP for >5 years. Up to 8% risk of AML. Patient has benefited from PARP. Risks and benefits of continued use discussed.      Informed decision made to continue PARP.  Zahida remains stable, no new symptoms.  stable.  Tolerating PARP at 100mg BID.  Otherwise, continue monthly labs.  She is getting these in [place]. Repeat  in three months.      Return in three months with labs and . Labs will be checked today.     Azucena Ponce MD  Gynecologic Oncology  [facility]     CC  Patient Care Team:  Homa Cruz as PCP - General (Internal Medicine)  Azucena Ponce MD as MD (Gynecologic Oncology)  Azucena Ponce MD as Assigned Cancer Care Provider  AZUCENA PONCE        Again, thank you for allowing me to participate in the care of your patient.        Sincerely,        Azucean Ponce MD

## 2024-09-26 NOTE — PROGRESS NOTES
"Oncology Rooming Note    September 26, 2024 12:14 PM   Zahida Rangel is a 64 year old female who presents for:    Chief Complaint   Patient presents with    Oncology Clinic Visit       Primary cancer of peritoneum        Initial Vitals: BP (!) 143/79   Pulse 75   Resp 16   Ht 1.651 m (5' 5\")   LMP  (LMP Unknown)   SpO2 98%   BMI 24.13 kg/m   Estimated body mass index is 24.13 kg/m  as calculated from the following:    Height as of this encounter: 1.651 m (5' 5\").    Weight as of 10/3/23: 65.8 kg (145 lb). Body surface area is 1.74 meters squared.  Moderate Pain (4) Comment: Data Unavailable   No LMP recorded (lmp unknown). Patient has had a hysterectomy.  Allergies reviewed: Yes  Medications reviewed: Yes    Medications: MEDICATION REFILLS NEEDED TODAY. Provider was notified.  Pharmacy name entered into [employer]:    [id number] - [place], [place] - [address] [facility] PH[facility] [address] [place] [address] PHARMACY [place], [place] - [address] [place] [address] [phone]  [facility], [place] - [address] [place] [address]    Frailty Screening:   Is the patient here for a new oncology consult visit in cancer care? 2. No      Clinical concerns:  1 year follow up      Melinda Pride            "

## 2024-09-26 NOTE — PATIENT INSTRUCTIONS
Labs today    Continue Lynparza, refill    Continue monthly labs (CBC with diff, complete metabolic panel) and  in three months    Return visit with Kris in three months    Chata Ponce MD  Gynecologic Oncology  [facility]

## 2024-09-26 NOTE — PROGRESS NOTES
Consult Notes on Referred Patient    Date: 9/26/2024     Patient is seen today for follow-up, video visit        Her history is as follows:    2-26-16: Referred to Gyn Onc for evaluation of cervical fibroid with prominent vascularity. CA-125 39.     3-11-16: Pap smear normal. Repeat  33. MRI pelvis showed 3.6 x 3.2 x 3cm mass right side of cervix, most likley a leiomyoma. EMS normal thickness. Small cystic area in cul de sac measuring 1.5cm, possible peritoneal inclusion cyst. Recommended repeat imaging in three months    6-15-16:  22. MRI pelvis with 2.7 x 3.0 x 3.3cm mass right posterolateral aspect of cervix, consistent with fibroid and similar to prior MRI. EMS 4mm. No further Gyn Onc follow-up recommended.    11/29 to 11/30/16: Admitted to [facility] with abdominal pain. CT abd/pelvis with peritoneal carcinomatosis. Fullness left ovary. Pelvic US with normal uterus, ES 7.4mm. Hypoechoic mass cervix measuring 3.2 x 2.8cm, ovaries normal, no free fluid.  53. Underwent CT guided biopsy of omental area showing neoplasm of Muellerian origin, difficult to characterize due to small amount of tissue.    12/12/16: Exploratory laparotomy, modified radical hysterectomy, bilateral salpingo-oophorectomy, bilateral pelvic and para-aortic lymphadenectomy, omentectomy, pelvic washings, intraperitoneal port placement, proctoscopy. Operative findings were notable for very small volume disease limited to pelvis, no obvious lesions in omentum or upper abdomen. R0 resected to no visible disease. 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 +Cytology. 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    The patient returns for follow-up. She was seen by [place] by Dr. Bella for second opinion regarding chemotherapy. Dose dense as well as IV/IP were  discussed.     1/12/17: Cycle #1 IV/IP Cisplatin and Taxol. Reaction to IV taxol, able to tolerate after slow infusion. Steroids premed for IP taxol, tolerated.  40    2/2/17: Cycle #2 IV/IP Cisplatin and Taxol.  30    2/23/17: Cycle #3 IV/IP Cisplatin and Taxol.  20    3/16/17: Cycle #4 IV/IP Cisplatin and Taxol.  18    4/6/17: Cycle #5 IV/IP Cisplatin and Taxol.  17    4/28/17: Cycle #6 IV/IP Cisplatin and Taxol.  17. Day 8 held due to anemia. Transfused 1 unit. Day 8 given on 5/12/17.    5/30/17: PET scan with no evidence of disease. 15 x 7 cm lymphocele on left, 8 x 4cm on right.    Underwent bilateral drain placements to treat lymphoceles. Complicated by infection with abscess development. Sclerotherapy performed and drains removed on 7/10/17. Had IP port removed on 7/20/17.    9/15/17: Complained of RLQ pain. Pelvic US negative.  9    11/27/17:  10    12/21/17:  9    3/28/18:  8 PET/CT on 3/29/18 BRIGIDO    4/10/18: CT A/P for RLQ pain BRIGIDO. US Appendix with no free fluid    4/12/18: CT A/P soft tissue stranding mesenteric fat right anterior abdominal fat    8/24/18: CT A/P for abdominal pain negative.  16    9/14/18: US abd for pain, negative for hernia. Seen in clinic for LLQ pain with associated pain with bladder emptying, feels that something is tethering her bladder to the abdominal wall and holding it there. Has seen Urology with negative workup. Was lying flat and noted a bulge in lower abdomen with associated tenderness. Had US today that was negative for hernia. Declined CT scan and cannot have MRI due to implants.     9/28/18:  82    10/2/18: CT A/P for ongoing pain. Multipled dilated loops for bowel with possible ileitis.    10/18/18:  62    11/19/18:  104    11/21/18: PET with recurrent peritoneal carcinomatosis. 1.2cm nodule mid abdomen, 1.2cm nodule sigmoid colon    11/28/18: CT biopsy Path showed recurrent high grade  carcinoma. Discussed salvage chemotherapy +/- PARPi with plans for possible surgery    12/5/18: C1D1 Carbo/Doxil. First cycle difficult, appeal ongoing for PARPi.  168    12/14/18: Seen in clinic. Plan for surgery after second rather than first cycle, coordinate with breast surgery.    1/4/19: C2D1 Carbo/Doxil. Hold on PARP due to counts, not wanting to potentially delay surgery.  90    1/28/19: PET CT with complete response, no signs of disease. Decision not to proceed with surgery due to CR.    2/1/19: C3D1 Carbo/Doxil. Continue to hold on PARP.  30     3/1/19: C4D1 Carbo/Doxil. Neulasta.  13    3/29/19: C5D1 Carbo/Doxil. Neulasta  10    4/23/19:  10    4/26/19: C6D1 Carbo/Doxil. Neulasta. Patient wanted to proceed with breast surgery ASAP.    6/10/19: Breast reconstruction done on 6/10/19.     6/25/19: PARPi Olaparib started 300mg BID    7/11/19: Dose reduced after two weeks due to significant fatigue. 200mg BID.     7/19/19: Some frustration with how she is feeling on the PARPi. Perhaps slightly better at lower dose but still dosen't like it. Continued at 200mg BID.    8/30/19: BRIGIDO, remains on 200mg BID of PARPi    10/11/19: Stable, remains on 200mg BID of PARPi,  7    12/20/19: Routine visit. Significant fatigue. PARP increased to 150mg BID at patient request for one month.    1/24/20: Developed breast cellulitis and implant infection while in [place]     1/30/20: Infected implant removed.  7    Increased PARPi to 200mg BID at end of March.    3/26/20: Labs in [place].  7    4/17/20: Phone visit. Back from [place]. Some occassional pelvic/pubic bone cramping. Due to ongoing fatigue, decreased PARPi to 150mg AM and 200mg in PM.  9    7/24/20: Due to ongoing fatigue, has dropped to 150mg BID, remains compliant    8/6/20:  8, Labs WNL. Crt 1.36    8/21/20: Seen in clinic. BRIGIDO on exam. Continue PARPi at 150mg BID    9/11/20: Patient seen today for unscheduled  visit. Had episode of bleeding x 1 on 9/8/20. Exam showed periurethral laceration. Continue PARP. Labs in [place].  9    3/9/21 -  7 Remains on PARP 150mg BID    7/7/21:  6    9/17/21:  9    12/13/21:  12 Remains on PARP 150mg BID    3/7/22:  8    5/11/22:  10 Remains on PARP 150mg BID    6/23/22:  11 Remains on PARP 150mg BID    Stopped PARP August 25 due to some issues with fluid retention.    Was off PARP until October 2022.     10/25/22:   11.  PARP resumed at 100mg BID     12/22:  Stable.  PARP 100mg BID     3/17/23:  Stable.  PARP 100mg BID     6/13/23:  Labs reviewed.   8, Cr 1.36      8/21/24:   6    Patient seen today for follow-up.  Living in [place].  Starting masters program in forensic science.  No new symptoms.  Taking Lynparza        Past Medical History:    No past medical history on file.      Past Surgical History:    Past Surgical History:   Procedure Laterality Date    IR CHEST PORT PLACEMENT > 5 YRS OF AGE  1/11/2017         Health Maintenance:  Health Maintenance Due   Topic Date Due    ADVANCE CARE PLANNING  Never done    COVID-19 Vaccine (1) Never done    COLORECTAL CANCER SCREENING  Never done    HIV SCREENING  Never done    HEPATITIS C SCREENING  Never done    ZOSTER IMMUNIZATION (1 of 2) Never done    PAP  Never done    LIPID  Never done    MAMMO SCREENING  07/17/2019    RSV VACCINE (1 - Risk 60-74 years 1-dose series) Never done    PHQ-2 (once per calendar year)  Never done    INFLUENZA VACCINE (1) 09/01/2024         Current Medications:     has a current medication list which includes the following prescription(s): albuterol, alpha-lipoic acid, calcium carbonate, cetirizine, cholecalciferol, clonazepam, collagen hydrolysate, eszopiclone, george (zingiber officinalis), lorazepam, magnesium bisglycinate, melatonin, multivitamin w/minerals, olaparib, olaparib, prednisone, triamcinolone, turmeric, and zolpidem.       Allergies:  "    Dilaudid [hydromorphone] and Penicillins        Social History:     Social History     Tobacco Use    Smoking status: Never    Smokeless tobacco: Never   Substance Use Topics    Alcohol use: Not on file       History   Drug Use Not on file           Family History:     The patient's family history is notable for :.    No family history on file.      Physical Exam:     BP (!) 143/79   Pulse 75   Resp 16   Ht 1.651 m (5' 5\")   LMP  (LMP Unknown)   SpO2 98%   BMI 24.13 kg/m    Body mass index is 24.13 kg/m .    General Appearance: healthy and alert, no distress     Assessment:     63yo with history of recurrent primary peritoneal cancer, now on PARP.    Total time for the day of visit was 30 minutes including, but not limited to, non-face-to-face time spent reviewing records, counseling, and coordination of care.     The longitudinal plan of care for the diagnosis(es)/condition(s) as documented were addressed during this visit. Due to the added complexity in care, I will continue to support Zahida in the subsequent management and with ongoing continuity of care.    Plan:       1.) Recurrent platinum sensitive primary peritoneal cancer: Completed two cycles with PET showing complete response. Had previously discussed possible secondary surgery given small volume disease with tentative plan had been for surgery in early January 2019, after first cycle, and then changed to surgery after her second cycle to allow for better planning with her breast surgeon to get expanders removed and implants placed.     Given complete response based on PET, decision was made not to proceed with surgery as I felt there was minimal to no benefit in undergoing a potentially complex surgical exploration with no obvious signs of disease on imaging but with potential significant delays in chemo if she has a complication.    Therefore, recommended completing six cycles of chemo and then PARPi maintenance. She completed six cycles. She " then completed her breast surgery and started PARPi, olaparib, in June 2019. Dose reduction (300BID to 200BID) after two weeks due to fatigue, very small decrease in Hgb and Cr slightly elevated. Dose decreased to 150mg BID in 12/2019 due to fatigue and negative impact on QOL. Stayed at 150mg BID for about three months and then back at 200mg BID and tolerating well. In April 2020, struggling again with fatigue so dropped to PARPi to 150mg in AM, 200mg in PM to ensure continuation and then to 150mg BID which she maintained from July 2020 to August 2022.      Was on a short break from PARP from August 2022 to October 2022 at which time she resumed at 100mg BID.  Discussed risks of long term PARP, SOLO study with 22% of patients on PARP for >5 years. Up to 8% risk of AML. Patient has benefited from PARP. Risks and benefits of continued use discussed.      Informed decision made to continue PARP.  Zahida remains stable, no new symptoms.  stable.  Tolerating PARP at 100mg BID.  Otherwise, continue monthly labs.  She is getting these in [place]. Repeat  in three months.      Return in three months with labs and . Labs will be checked today.     Azucena Ponce MD  Gynecologic Oncology  [facility]     CC  Patient Care Team:  Homa Cruz as PCP - General (Internal Medicine)  Azucena Ponce MD as MD (Gynecologic Oncology)  Azucena Ponce MD as Assigned Cancer Care Provider  AZUCENA PONCE

## 2024-10-14 ENCOUNTER — PATIENT OUTREACH (OUTPATIENT)
Dept: ONCOLOGY | Facility: HOSPITAL | Age: 64
End: 2024-10-14
Payer: COMMERCIAL

## 2024-10-14 NOTE — PROGRESS NOTES
"Staff message received stating an individual from [facility] called needing to verify validity of recent letter signed by Dr. Ponce.    Return call to Junie Byers with disability services at [facility]. Junie called clinic to verify that letter that [facility] received was signed by Dr. Ponce. Informed Junie that  Dr. Ponce is not in office this week. Will update Junie when/if Dr. Ponce returns to clinic and confirms letter.     Incoming call from Zahida who states that her school has \"no right to call\" and it is \"illegal\" for them to call referencing HIPAA. Zahida also states that \"you cannot even confirm I am a patient\" again referencing HIPAA. Zahida asks that her letter be copied on to letterhead and uploaded via [employer]. Zahida also states that she does not want anyone to communicate with school.       Xiomara Colindres RN  10/14/24  9:55 AM      "

## 2024-10-22 DIAGNOSIS — Z15.01 BRCA2 GENE MUTATION POSITIVE: ICD-10-CM

## 2024-10-22 DIAGNOSIS — C48.2 PRIMARY CANCER OF PERITONEUM (H): Primary | ICD-10-CM

## 2024-10-22 DIAGNOSIS — Z15.09 BRCA2 GENE MUTATION POSITIVE: ICD-10-CM

## 2025-01-06 DIAGNOSIS — C48.2 PRIMARY CANCER OF PERITONEUM (H): Primary | ICD-10-CM

## 2025-01-06 DIAGNOSIS — Z15.09 BRCA2 GENE MUTATION POSITIVE: ICD-10-CM

## 2025-01-06 DIAGNOSIS — Z15.01 BRCA2 GENE MUTATION POSITIVE: ICD-10-CM

## 2025-01-07 ENCOUNTER — TELEPHONE (OUTPATIENT)
Dept: ONCOLOGY | Facility: CLINIC | Age: 65
End: 2025-01-07
Payer: COMMERCIAL

## 2025-01-07 DIAGNOSIS — C48.2 PRIMARY CANCER OF PERITONEUM (H): Primary | ICD-10-CM

## 2025-01-07 DIAGNOSIS — Z15.01 BRCA2 GENE MUTATION POSITIVE: ICD-10-CM

## 2025-01-07 DIAGNOSIS — Z15.09 BRCA2 GENE MUTATION POSITIVE: ICD-10-CM

## 2025-01-07 NOTE — TELEPHONE ENCOUNTER
FREE DRUG APPLICATION INITIATED    Medication: LYNPARZA 100 MG PO TABS  Free Drug Program Name:   [employer]  Date Submitted: 1/7/2025  Phone #: [phone]  Fax #: [phone]  Additional Information: emailed larry to Kris        Thank you,    Huma Jackson  Oncology Pharmacy Liaison II  [email or web address]  Phone: [phone]  Fax: [phone]

## 2025-01-07 NOTE — TELEPHONE ENCOUNTER
Pt lives in [place] and has [facility] Part D- We cannot ship to her.     YOANNA APPROVED    Medication: LYNPARZA 100 MG PO TABS  Foundation Effective Date: 1/7/2025  Foundation Expiration Date: 12/31/2025  Additional Information:   Patient Notified: yes    The following patient has been approved for FPAP:    FPAP Start Date: 2025-01-07 , FPAP End Date: 2025-12-31  Application #: [id number]  Patient Name: Zahida Rangel  MRN (FPAP ERx Cardholder ID): [id number]  Therapy: Lynparza  Department:  ONCOLOGY ADULT  MTM Referral: No  Submitted By: Fahad Jackson  Approved By: Ivette Oshea  Approval Comments: New FPAP patient approved: Please put effective dates in CPS holds, FPAP manual third party and add patient note.      Thank you,    Fahad Jackson  Oncology Pharmacy Liaison II  [email or web address]  Phone: [phone]  Fax: [phone]

## 2025-01-09 NOTE — TELEPHONE ENCOUNTER
I have reviewed and agree to the information submitted for this Free Drug Application.    Arpan Lawson, PharmD, BCACP  Hematology/Oncology Clinical Pharmacist  [facility]  [phone]

## 2025-01-10 ENCOUNTER — TRANSFERRED RECORDS (OUTPATIENT)
Dept: HEALTH INFORMATION MANAGEMENT | Facility: CLINIC | Age: 65
End: 2025-01-10
Payer: COMMERCIAL

## 2025-03-06 ENCOUNTER — TRANSFERRED RECORDS (OUTPATIENT)
Dept: HEALTH INFORMATION MANAGEMENT | Facility: CLINIC | Age: 65
End: 2025-03-06
Payer: COMMERCIAL

## 2025-03-06 LAB
ALT SERPL-CCNC: 23 U/L (ref 6–29)
AST SERPL-CCNC: 20 U/L (ref 10–35)
CREATININE (EXTERNAL): 1.21 MG/DL (ref 0.5–1.05)
GFR ESTIMATED (EXTERNAL): 50 ML/MIN/1.73M2
GLUCOSE (EXTERNAL): 72 MG/DL (ref 65–99)
POTASSIUM (EXTERNAL): 4.3 MMOL/L (ref 3.5–5.3)

## 2025-03-13 ENCOUNTER — PATIENT OUTREACH (OUTPATIENT)
Dept: ONCOLOGY | Facility: HOSPITAL | Age: 65
End: 2025-03-13
Payer: COMMERCIAL

## 2025-03-13 NOTE — PROGRESS NOTES
Call to O lab to request recent lab work for Dr. Ponce be faxed to [phone]. Incoming fax with lab results faxed to medical records to be uploaded into Zahida's chart. Confirmed via [employer] at 4:43 pm on 3/13/25.    Xiomara Colindres RN  03/13/25  4:47 PM

## 2025-04-24 ENCOUNTER — PATIENT OUTREACH (OUTPATIENT)
Dept: ONCOLOGY | Facility: HOSPITAL | Age: 65
End: 2025-04-24
Payer: COMMERCIAL

## 2025-04-24 NOTE — PROGRESS NOTES
Call to [facility] ([phone]) to get results for monthly lab work. Per [facility], last lab work on file is from 3/6/25. Results from 3/6/25 are already in our sytem. No new labs available.     Xiomara Colindres RN  04/24/25  1:05 PM

## 2025-05-01 ENCOUNTER — TRANSFERRED RECORDS (OUTPATIENT)
Dept: HEALTH INFORMATION MANAGEMENT | Facility: CLINIC | Age: 65
End: 2025-05-01
Payer: COMMERCIAL

## 2025-05-02 ENCOUNTER — PATIENT OUTREACH (OUTPATIENT)
Dept: ONCOLOGY | Facility: HOSPITAL | Age: 65
End: 2025-05-02
Payer: COMMERCIAL

## 2025-05-02 NOTE — PROGRESS NOTES
Call to [facility] ([phone]) to get results for monthly lab work. [facility] staff report they will fax recent lab work to clinic.     Received [facility] lab work from 5/1/25 via fax. Reports sent to [facility] records to be uploaded in to Zahida's chart. Verified via Right[employer]ax at 1254 pm on 5/2/25.    Xiomara Colindres RN  05/02/25  12:26 PM

## 2025-06-30 ENCOUNTER — PATIENT OUTREACH (OUTPATIENT)
Dept: ONCOLOGY | Facility: HOSPITAL | Age: 65
End: 2025-06-30
Payer: COMMERCIAL

## 2025-06-30 NOTE — PROGRESS NOTES
Call to [facility] where Zahida has had monthly blood work done in the past. Per [facility] staff, no labs done since 5/1/25.    [facility] # [phone]     Xiomara Colindres RN  06/30/25  2:28 PM

## 2025-07-02 ENCOUNTER — TRANSFERRED RECORDS (OUTPATIENT)
Dept: HEALTH INFORMATION MANAGEMENT | Facility: CLINIC | Age: 65
End: 2025-07-02
Payer: COMMERCIAL

## 2025-07-15 ENCOUNTER — MEDICAL CORRESPONDENCE (OUTPATIENT)
Dept: HEALTH INFORMATION MANAGEMENT | Facility: CLINIC | Age: 65
End: 2025-07-15
Payer: COMMERCIAL

## 2025-08-15 ENCOUNTER — PATIENT OUTREACH (OUTPATIENT)
Dept: ONCOLOGY | Facility: HOSPITAL | Age: 65
End: 2025-08-15
Payer: COMMERCIAL

## 2025-08-21 ENCOUNTER — TRANSFERRED RECORDS (OUTPATIENT)
Dept: HEALTH INFORMATION MANAGEMENT | Facility: CLINIC | Age: 65
End: 2025-08-21
Payer: COMMERCIAL

## 2025-08-27 ENCOUNTER — PATIENT OUTREACH (OUTPATIENT)
Dept: ONCOLOGY | Facility: HOSPITAL | Age: 65
End: 2025-08-27
Payer: COMMERCIAL